# Patient Record
Sex: FEMALE | Race: WHITE | HISPANIC OR LATINO | Employment: FULL TIME | URBAN - METROPOLITAN AREA
[De-identification: names, ages, dates, MRNs, and addresses within clinical notes are randomized per-mention and may not be internally consistent; named-entity substitution may affect disease eponyms.]

---

## 2018-10-08 ENCOUNTER — HOSPITAL ENCOUNTER (EMERGENCY)
Facility: HOSPITAL | Age: 43
Discharge: HOME | End: 2018-10-08
Attending: EMERGENCY MEDICINE
Payer: MEDICAID

## 2018-10-08 VITALS
TEMPERATURE: 99 F | HEIGHT: 62 IN | HEART RATE: 68 BPM | DIASTOLIC BLOOD PRESSURE: 84 MMHG | RESPIRATION RATE: 17 BRPM | SYSTOLIC BLOOD PRESSURE: 144 MMHG | BODY MASS INDEX: 35.88 KG/M2 | WEIGHT: 195 LBS

## 2018-10-08 DIAGNOSIS — M67.432 GANGLION CYST OF WRIST, LEFT: Primary | ICD-10-CM

## 2018-10-08 LAB
B-HCG UR QL: NEGATIVE
CTP QC/QA: YES

## 2018-10-08 PROCEDURE — 81025 URINE PREGNANCY TEST: CPT | Performed by: EMERGENCY MEDICINE

## 2018-10-08 PROCEDURE — 99283 EMERGENCY DEPT VISIT LOW MDM: CPT | Mod: 25

## 2018-10-08 RX ORDER — IBUPROFEN 600 MG/1
600 TABLET ORAL EVERY 6 HOURS PRN
Qty: 20 TABLET | Refills: 0 | OUTPATIENT
Start: 2018-10-08 | End: 2022-02-27

## 2018-10-08 NOTE — ED PROVIDER NOTES
Encounter Date: 10/8/2018       History     Chief Complaint   Patient presents with    Hand Pain     left hand pain, increasing for 2 months, denies injury or fall, states was told by primary it was a cyst, states pain is increasing     The history is provided by the patient. No  was used.   Hand Injury    Illness onset: Pt c /o pain without injury to left hand due to ganglion cyst (known) for several mos. Has not seen an orthopedic specialist. There was no injury mechanism. Pain location: left hand. The quality of the pain is described as aching. The pain is at a severity of 6/10. The pain has been constant since the incident. Pertinent negatives include no fever. She reports no foreign bodies present. The symptoms are aggravated by movement, use and palpation.     Review of patient's allergies indicates:   Allergen Reactions    Dilaudid [hydromorphone]      History reviewed. No pertinent past medical history.  Past Surgical History:   Procedure Laterality Date     SECTION      OOPHORECTOMY       History reviewed. No pertinent family history.  Social History     Tobacco Use    Smoking status: Never Smoker   Substance Use Topics    Alcohol use: Yes     Comment: socially    Drug use: No     Review of Systems   Constitutional: Negative.  Negative for appetite change and fever.   HENT: Negative.  Negative for congestion, dental problem, ear discharge, hearing loss, mouth sores, rhinorrhea and trouble swallowing.    Eyes: Negative.  Negative for pain and discharge.   Respiratory: Negative.  Negative for shortness of breath.    Cardiovascular: Negative.  Negative for chest pain.   Gastrointestinal: Negative.  Negative for abdominal distention, abdominal pain, constipation, diarrhea, nausea, rectal pain and vomiting.   Endocrine: Negative.    Genitourinary: Negative.  Negative for dyspareunia, dysuria, hematuria, vaginal bleeding, vaginal discharge and vaginal pain.   Musculoskeletal:  Negative for back pain and neck pain.        Left hand pain/cyst   Skin: Negative.  Negative for rash.   Allergic/Immunologic: Negative.    Neurological: Negative.  Negative for facial asymmetry, speech difficulty and light-headedness.   Hematological: Negative.    Psychiatric/Behavioral: Negative.  Negative for agitation, dysphoric mood and sleep disturbance.   All other systems reviewed and are negative.      Physical Exam     Initial Vitals [10/08/18 1634]   BP Pulse Resp Temp SpO2   (!) 144/84 68 17 98.7 °F (37.1 °C) --      MAP       --         Physical Exam    Nursing note and vitals reviewed.  Constitutional: She appears well-developed and well-nourished. She is not diaphoretic.  Non-toxic appearance. She does not appear ill. No distress.   HENT:   Head: Normocephalic and atraumatic.   Eyes: Conjunctivae are normal. Right eye exhibits no discharge. Left eye exhibits no discharge.   Neck: Normal range of motion.   Cardiovascular: Normal rate, regular rhythm, normal heart sounds and intact distal pulses. Exam reveals no gallop and no friction rub.    No murmur heard.  Pulmonary/Chest: Breath sounds normal. No respiratory distress. She has no wheezes. She has no rhonchi. She has no rales. She exhibits no tenderness.   Musculoskeletal: Normal range of motion.        Left hand: She exhibits tenderness (ganglion cyst noted to dorsal aspect without erythema ). She exhibits normal range of motion, normal two-point discrimination, normal capillary refill, no deformity, no laceration and no swelling. Normal sensation noted. Decreased sensation is not present in the ulnar distribution, is not present in the medial redistribution and is not present in the radial distribution. Normal strength noted. She exhibits no finger abduction, no thumb/finger opposition and no wrist extension trouble.   Neurological: She is alert and oriented to person, place, and time.   Skin: Skin is warm and dry. Capillary refill takes less than  2 seconds. No rash noted.   Psychiatric: She has a normal mood and affect. Her behavior is normal. Judgment and thought content normal.         ED Course   Procedures  Labs Reviewed   POCT URINE PREGNANCY          Imaging Results    None          Medical Decision Making:   Initial Assessment:   Left ganglion cyst  Differential Diagnosis:   Abscess, contusion  ED Management:  Pt will be discharged home on Motrin with instructions to f/u with PCP for Ortho referral and return to ER as needed if symptoms worsen/fail to improve. Pt verbalized understanding of discharge instructions and treatment plan.                      Clinical Impression:   The encounter diagnosis was Ganglion cyst of wrist, left.                             Toussaint Battley III, FNP  10/08/18 7869

## 2019-04-09 ENCOUNTER — HOSPITAL ENCOUNTER (EMERGENCY)
Facility: HOSPITAL | Age: 44
Discharge: HOME | End: 2019-04-09
Attending: EMERGENCY MEDICINE
Payer: MEDICAID

## 2019-04-09 VITALS
SYSTOLIC BLOOD PRESSURE: 146 MMHG | RESPIRATION RATE: 18 BRPM | HEART RATE: 67 BPM | BODY MASS INDEX: 39.46 KG/M2 | OXYGEN SATURATION: 100 % | TEMPERATURE: 97 F | WEIGHT: 201 LBS | HEIGHT: 60 IN | DIASTOLIC BLOOD PRESSURE: 101 MMHG

## 2019-04-09 DIAGNOSIS — M25.462 KNEE EFFUSION, LEFT: Primary | ICD-10-CM

## 2019-04-09 DIAGNOSIS — M25.562 LEFT KNEE PAIN: ICD-10-CM

## 2019-04-09 LAB
B-HCG UR QL: NEGATIVE
CTP QC/QA: YES
URATE SERPL-MCNC: 5.5 MG/DL (ref 2.4–5.7)

## 2019-04-09 PROCEDURE — 25000003 PHARM REV CODE 250: Mod: ER | Performed by: EMERGENCY MEDICINE

## 2019-04-09 PROCEDURE — 84550 ASSAY OF BLOOD/URIC ACID: CPT

## 2019-04-09 PROCEDURE — 81025 URINE PREGNANCY TEST: CPT | Mod: ER | Performed by: EMERGENCY MEDICINE

## 2019-04-09 PROCEDURE — 29505 APPLICATION LONG LEG SPLINT: CPT | Mod: LT,ER

## 2019-04-09 PROCEDURE — 99284 EMERGENCY DEPT VISIT MOD MDM: CPT | Mod: 25,ER

## 2019-04-09 RX ORDER — KETOROLAC TROMETHAMINE 10 MG/1
10 TABLET, FILM COATED ORAL
Status: COMPLETED | OUTPATIENT
Start: 2019-04-09 | End: 2019-04-09

## 2019-04-09 RX ORDER — KETOROLAC TROMETHAMINE 10 MG/1
10 TABLET, FILM COATED ORAL EVERY 6 HOURS
Qty: 20 TABLET | Refills: 0 | Status: SHIPPED | OUTPATIENT
Start: 2019-04-09

## 2019-04-09 RX ADMIN — KETOROLAC TROMETHAMINE 10 MG: 10 TABLET, FILM COATED ORAL at 02:04

## 2019-04-09 NOTE — ED PROVIDER NOTES
Encounter Date: 2019    SCRIBE #1 NOTE: I, Tova Gomes, am scribing for, and in the presence of,  Dr. Costa . I have scribed the following portions of the note - Other sections scribed: HPI,ROS,PE .       History     Chief Complaint   Patient presents with    Knee Pain     pt c/o pain and swelling to the left knee without known injury. pt states pain started 2 weeks ago with no relief with otc meds.      42 y/o/f presents with left knee pain radiating down the leg for 2 weeks. Describes the pain as stabbing. Rates pain is a 10/10. Sometimes she notices it gets hot. She has been icing it at night and using a knee brace. Last night while applying bengay she noticed swelling to her leg and ankle. Worse with walking and weight bearing. She is a . Denies injury, back pain, neck pain, numbness, weakness, recent injury/travel or calf tenderness. Took 4 Ibuprofen with some relief.     The history is provided by the patient. No  was used.     Review of patient's allergies indicates:   Allergen Reactions    Dilaudid [hydromorphone]      History reviewed. No pertinent past medical history.  Past Surgical History:   Procedure Laterality Date     SECTION      OOPHORECTOMY       History reviewed. No pertinent family history.  Social History     Tobacco Use    Smoking status: Never Smoker   Substance Use Topics    Alcohol use: Yes     Comment: socially    Drug use: No     Review of Systems   Musculoskeletal: Positive for arthralgias (left knee pain). Negative for back pain and neck pain.   Skin: Negative for color change and pallor.   Neurological: Negative for weakness and numbness.       Physical Exam     Initial Vitals [19 1336]   BP Pulse Resp Temp SpO2   (!) 164/90 67 18 97.4 °F (36.3 °C) 100 %      MAP       --         Physical Exam    Nursing note and vitals reviewed.  Constitutional: She appears well-developed and well-nourished.   HENT:   Head: Normocephalic  and atraumatic.   Eyes: Conjunctivae are normal.   Neck: Normal range of motion. Neck supple.   Cardiovascular: Normal rate and intact distal pulses.   Pulmonary/Chest: Effort normal. No respiratory distress.   Musculoskeletal: Normal range of motion. She exhibits edema and tenderness.        Right knee: Tenderness found.        Legs:  Minimal swelling, Full ROM, anterior and posterior drawer negative.    Neurological: She is alert and oriented to person, place, and time. No sensory deficit.   Strong pulses.    Skin: Skin is warm and dry.   Psychiatric: She has a normal mood and affect.         ED Course   Procedures  Labs Reviewed   URIC ACID   POCT URINE PREGNANCY          Imaging Results          X-Ray Knee 3 View Left (Final result)  Result time 04/09/19 14:51:43    Final result by Roni Cuellar MD (04/09/19 14:51:43)                 Impression:      Moderate volume suprapatellar synovial complex/effusion.      Electronically signed by: Roni Cuellar MD  Date:    04/09/2019  Time:    14:51             Narrative:    EXAMINATION:  XR KNEE 3 VIEW LEFT    CLINICAL HISTORY:  Pain in left knee    TECHNIQUE:  AP, lateral, and Merchant views of the left knee were performed.    COMPARISON:  None    FINDINGS:  No displaced fracture or subluxation.  No suspicious bone lesion.    Mild femoral tibial and patellofemoral DJD.    Moderate suprapatellar synovial complex/effusion.                                 Medical Decision Making:   Initial Assessment:   42 y/o/f presents with left knee pain for 2 weeks. Exam significant for left lateral and anterior knee tenderness with minimal swelling.     Clinical Tests:   Lab Tests: Ordered and Reviewed  Radiological Study: Reviewed and Ordered  ED Management:  Left knee XR ordered.  X-ray does show an effusion to the knee.  Patient's placed a knee immobilizer and will be referred to Orthopedics.  Uric acid level was normal. Patient will be treated with Toradol.  She was  instructed to elevate her leg as much as possible.            Scribe Attestation:   Scribe #1: I performed the above scribed service and the documentation accurately describes the services I performed. I attest to the accuracy of the note.       I, Dr. Zaida Costa, personally performed the services described in this documentation. All medical record entries made by the scribe were at my direction and in my presence.  I have reviewed the chart and agree that the record reflects my personal performance and is accurate and complete. Zaida Costa MD.  7:35 AM 04/10/2019             Clinical Impression:     1. Knee effusion, left    2. Left knee pain                                     Zaida Costa MD  04/10/19 0735

## 2019-04-09 NOTE — ED NOTES
Blood drawn from Left ac per butterfly needle using aseptic technique. Pressure dressing applied to site. Pt tolerated well.

## 2021-01-29 ENCOUNTER — CLINICAL SUPPORT (OUTPATIENT)
Dept: URGENT CARE | Facility: CLINIC | Age: 46
End: 2021-01-29
Payer: MEDICAID

## 2021-01-29 DIAGNOSIS — Z11.52 ENCOUNTER FOR SCREENING LABORATORY TESTING FOR COVID-19 VIRUS: Primary | ICD-10-CM

## 2021-01-29 LAB
CTP QC/QA: YES
SARS-COV-2 RDRP RESP QL NAA+PROBE: NEGATIVE

## 2021-01-29 PROCEDURE — U0002 COVID-19 LAB TEST NON-CDC: HCPCS | Mod: QW,S$GLB,, | Performed by: PHYSICIAN ASSISTANT

## 2021-01-29 PROCEDURE — U0002: ICD-10-PCS | Mod: QW,S$GLB,, | Performed by: PHYSICIAN ASSISTANT

## 2021-03-04 ENCOUNTER — CLINICAL SUPPORT (OUTPATIENT)
Dept: URGENT CARE | Facility: CLINIC | Age: 46
End: 2021-03-04
Payer: MEDICAID

## 2021-03-04 DIAGNOSIS — Z11.9 SCREENING EXAMINATION FOR INFECTIOUS DISEASE: Primary | ICD-10-CM

## 2021-03-04 LAB
CTP QC/QA: YES
SARS-COV-2 RDRP RESP QL NAA+PROBE: NEGATIVE

## 2021-03-04 PROCEDURE — U0002: ICD-10-PCS | Mod: QW,S$GLB,, | Performed by: PHYSICIAN ASSISTANT

## 2021-03-04 PROCEDURE — U0002 COVID-19 LAB TEST NON-CDC: HCPCS | Mod: QW,S$GLB,, | Performed by: PHYSICIAN ASSISTANT

## 2021-04-16 ENCOUNTER — PATIENT MESSAGE (OUTPATIENT)
Dept: RESEARCH | Facility: HOSPITAL | Age: 46
End: 2021-04-16

## 2021-07-01 ENCOUNTER — PATIENT MESSAGE (OUTPATIENT)
Dept: ADMINISTRATIVE | Facility: OTHER | Age: 46
End: 2021-07-01

## 2021-08-15 ENCOUNTER — CLINICAL SUPPORT (OUTPATIENT)
Dept: URGENT CARE | Facility: CLINIC | Age: 46
End: 2021-08-15
Payer: MEDICAID

## 2021-08-15 DIAGNOSIS — Z20.822 ENCOUNTER FOR LABORATORY TESTING FOR COVID-19 VIRUS: Primary | ICD-10-CM

## 2021-08-15 LAB
CTP QC/QA: YES
SARS-COV-2 RDRP RESP QL NAA+PROBE: NEGATIVE

## 2021-08-15 PROCEDURE — U0002 COVID-19 LAB TEST NON-CDC: HCPCS | Mod: QW,S$GLB,, | Performed by: PHYSICIAN ASSISTANT

## 2021-08-15 PROCEDURE — U0002: ICD-10-PCS | Mod: QW,S$GLB,, | Performed by: PHYSICIAN ASSISTANT

## 2022-01-10 ENCOUNTER — OFFICE VISIT (OUTPATIENT)
Dept: URGENT CARE | Facility: CLINIC | Age: 47
End: 2022-01-10
Payer: MEDICAID

## 2022-01-10 VITALS
OXYGEN SATURATION: 99 % | HEART RATE: 91 BPM | DIASTOLIC BLOOD PRESSURE: 100 MMHG | HEIGHT: 60 IN | RESPIRATION RATE: 16 BRPM | SYSTOLIC BLOOD PRESSURE: 165 MMHG | BODY MASS INDEX: 39.27 KG/M2 | TEMPERATURE: 98 F | WEIGHT: 200 LBS

## 2022-01-10 DIAGNOSIS — J02.9 SORE THROAT: ICD-10-CM

## 2022-01-10 DIAGNOSIS — U07.1 COVID-19 VIRUS INFECTION: Primary | ICD-10-CM

## 2022-01-10 LAB
CTP QC/QA: YES
SARS-COV-2 RDRP RESP QL NAA+PROBE: POSITIVE

## 2022-01-10 PROCEDURE — 3077F PR MOST RECENT SYSTOLIC BLOOD PRESSURE >= 140 MM HG: ICD-10-PCS | Mod: CPTII,S$GLB,, | Performed by: NURSE PRACTITIONER

## 2022-01-10 PROCEDURE — 99203 OFFICE O/P NEW LOW 30 MIN: CPT | Mod: S$GLB,,, | Performed by: NURSE PRACTITIONER

## 2022-01-10 PROCEDURE — U0002: ICD-10-PCS | Mod: QW,S$GLB,, | Performed by: NURSE PRACTITIONER

## 2022-01-10 PROCEDURE — 1160F PR REVIEW ALL MEDS BY PRESCRIBER/CLIN PHARMACIST DOCUMENTED: ICD-10-PCS | Mod: CPTII,S$GLB,, | Performed by: NURSE PRACTITIONER

## 2022-01-10 PROCEDURE — 3077F SYST BP >= 140 MM HG: CPT | Mod: CPTII,S$GLB,, | Performed by: NURSE PRACTITIONER

## 2022-01-10 PROCEDURE — 99203 PR OFFICE/OUTPT VISIT, NEW, LEVL III, 30-44 MIN: ICD-10-PCS | Mod: S$GLB,,, | Performed by: NURSE PRACTITIONER

## 2022-01-10 PROCEDURE — 3080F DIAST BP >= 90 MM HG: CPT | Mod: CPTII,S$GLB,, | Performed by: NURSE PRACTITIONER

## 2022-01-10 PROCEDURE — 1159F MED LIST DOCD IN RCRD: CPT | Mod: CPTII,S$GLB,, | Performed by: NURSE PRACTITIONER

## 2022-01-10 PROCEDURE — 1159F PR MEDICATION LIST DOCUMENTED IN MEDICAL RECORD: ICD-10-PCS | Mod: CPTII,S$GLB,, | Performed by: NURSE PRACTITIONER

## 2022-01-10 PROCEDURE — 1160F RVW MEDS BY RX/DR IN RCRD: CPT | Mod: CPTII,S$GLB,, | Performed by: NURSE PRACTITIONER

## 2022-01-10 PROCEDURE — 3008F BODY MASS INDEX DOCD: CPT | Mod: CPTII,S$GLB,, | Performed by: NURSE PRACTITIONER

## 2022-01-10 PROCEDURE — 3008F PR BODY MASS INDEX (BMI) DOCUMENTED: ICD-10-PCS | Mod: CPTII,S$GLB,, | Performed by: NURSE PRACTITIONER

## 2022-01-10 PROCEDURE — 3080F PR MOST RECENT DIASTOLIC BLOOD PRESSURE >= 90 MM HG: ICD-10-PCS | Mod: CPTII,S$GLB,, | Performed by: NURSE PRACTITIONER

## 2022-01-10 PROCEDURE — U0002 COVID-19 LAB TEST NON-CDC: HCPCS | Mod: QW,S$GLB,, | Performed by: NURSE PRACTITIONER

## 2022-01-10 NOTE — LETTER
[address], [address] ? [place], [address] ? Phone [phone] ? Fax [phone]           Return to Work/School    Patient: Kesha Marcum  YOB: 1975   Date: 01/10/2022      To Whom It May Concern:     Kesha Marcum was in contact with/seen in my office on 01/10/2022. COVID-19 is present in our communities across the [facility]. Not all patients are eligible or appropriate to be tested. In this situation, your employee meets the following criteria:     Kesha Marcum has met the criteria for COVID-19 testing and has a POSITIVE result. She can return to work once they are asymptomatic for 24 hours without the use of fever reducing medications AND at least five days from the start of symptoms (or from the first positive result if they have no symptoms).      If you have any questions or concerns, or if I can be of further assistance, please do not hesitate to contact me.     Sincerely,          Pam Linares NP

## 2022-01-11 NOTE — PROGRESS NOTES
Subjective:       Patient ID: Kesha Marcum is a 46 y.o. female.    Vitals:  height is 5' (1.524 m) and weight is 90.7 kg (200 lb). Her temperature is 97.8 °F (36.6 °C). Her pulse is 91. Her respiration is 16 and oxygen saturation is 99%.     Chief Complaint: Sore Throat    Pt c/o sore throat since Friday. Mild congestion, she has been taking antihistamines that has been drying her sinuses up. No fever. No meds. BP is elevated, she is being evaluated for HTN through primary. She is vaccinated    Sore Throat   This is a new problem. The current episode started in the past 7 days. The problem has been gradually worsening. Associated symptoms include congestion and headaches. Pertinent negatives include no abdominal pain, coughing, diarrhea, drooling, ear discharge, ear pain, hoarse voice, plugged ear sensation, neck pain, shortness of breath, stridor, swollen glands, trouble swallowing or vomiting. She has tried nothing for the symptoms.       Constitution: Positive for sweating. Negative for chills, fatigue and fever.   HENT: Positive for congestion and sore throat. Negative for ear pain, ear discharge, drooling and trouble swallowing.    Neck: Negative for neck pain.   Respiratory: Negative for cough, shortness of breath and stridor.    Gastrointestinal: Negative for abdominal pain, vomiting and diarrhea.   Neurological: Positive for headaches.       Objective:      Physical Exam   Constitutional: She is oriented to person, place, and time. She appears well-developed and well-nourished. She is cooperative.  Non-toxic appearance. She does not have a sickly appearance. She does not appear ill. No distress.   HENT:   Head: Normocephalic and atraumatic.   Ears:   Right Ear: Hearing and external ear normal.   Left Ear: Hearing and external ear normal.   Nose: Nose normal. No mucosal edema, rhinorrhea or nasal deformity. No epistaxis. Right sinus exhibits no maxillary sinus tenderness and no frontal sinus tenderness.  Left sinus exhibits no maxillary sinus tenderness and no frontal sinus tenderness.   Mouth/Throat: Uvula is midline, oropharynx is clear and moist and mucous membranes are normal. No trismus in the jaw. Normal dentition. No uvula swelling. No oropharyngeal exudate, posterior oropharyngeal edema or posterior oropharyngeal erythema.   Eyes: Conjunctivae and lids are normal. No scleral icterus.   Neck: Trachea normal and phonation normal. Neck supple. No edema present. No erythema present. No neck rigidity present.   Cardiovascular: Normal rate, regular rhythm, normal heart sounds, intact distal pulses and normal pulses.   Pulmonary/Chest: Effort normal and breath sounds normal. No respiratory distress. She has no decreased breath sounds. She has no rhonchi.    Comments: Speaking in full and clear sentences with room air pulse ox of 99%    Abdominal: Normal appearance.   Musculoskeletal: Normal range of motion.         General: No deformity or edema. Normal range of motion.   Neurological: She is alert and oriented to person, place, and time. She exhibits normal muscle tone. Coordination normal.   Skin: Skin is warm, dry, intact, not diaphoretic and not pale.   Psychiatric: She has a normal mood and affect. Her speech is normal and behavior is normal. Judgment and thought content normal. Cognition and memory  Nursing note and vitals reviewed.    Results for orders placed or performed in visit on 01/10/22   POCT COVID-19 Rapid Screening   Result Value Ref Range    POC Rapid COVID Positive (A) Negative     Acceptable Yes            Assessment:       1. COVID-19 virus infection    2. Sore throat          Plan:       Lab reviewed.  Covid 19 risk score of 1    COVID-19 virus infection    Sore throat  -     POCT COVID-19 Rapid Screening      Patient Instructions   You have tested positive for COVID-19 today.      ISOLATION  If you tested positive and do not have symptoms, you must isolate for 5 days starting  on the day of the positive test. I    If you tested positive and have symptoms, you must isolate for 5 days starting on the day of the first symptoms,  not the day of the positive test.     This is the most important part, both the CDC and the [facility] emphasize that you do not test out of isolation.     After 5 days, if your symptoms have improved and you have not had fever on day 5, you can return to the community on day 6- NO TESTING REQUIRED!      In fact, we do not retest if you were positive in the last 90 days.    After your 5 days of isolation are completed, the CDC recommends strict mask use for the first 5 days that you come out of isolation.  Patient Education       COVID-19 Discharge Instructions   About this topic   Coronavirus disease 2019 is also known as COVID-19. It is a viral illness that infects the lungs. It is caused by a virus called SARS-associated coronavirus (SARS-CoV-2).  The signs of COVID-19 most often start a few days after you have been infected. In some people, it takes longer to show signs. Others never show signs of the infection. You may have a cough, fever, shaking chills and it may be hard to breathe. You may be very tired, have muscle aches, a headache or sore throat. Some people have an upset stomach or loose stools. Others lose their sense of smell or taste. You may not have these signs all the time and they may come and go while you are sick.  The virus spreads easily through droplets when you talk, sneeze, or cough. You can pass the virus to others when you are talking close together, singing, hugging, sharing food, or shaking hands. Doctors believe the germs also survive on surfaces like tables, door handles, and telephones. However, this is not a common way that COVID-19 spreads. Doctors believe you can also spread the infection even if you dont have any symptoms, but they do not know how that happens. This is why getting vaccinated is one of the best ways to keep you healthy  and slow the spread of the virus.  Some people have a mild case of COVID-19 and are able to stay at home and away from others until they feel better. Others may need to be in the hospital if they are very sick. Some people with COVID-19 can have some symptoms for weeks or months. People with COVID-19 must isolate themselves. You can start to be around others when your doctor says it is safe to do so.       What care is needed at home?   · Ask your doctor what you need to do when you go home. Make sure you ask questions if you do not understand what the doctor says.  · Drink lots of water, juice, or broth to replace fluids lost from a fever.  · You may use cool mist humidifiers to help ease congestion and coughing.  · Use 2 to 3 pillows to prop yourself up when you lie down to make it easier to breathe and sleep.  · Do not smoke and do not drink beer, wine, and mixed drinks (alcohol).  · To lower the chance of passing the infection to others, get a COVID-19 vaccine after your infection has resolved.  · If you have not been fully vaccinated:  ? Wear a mask over your mouth and nose if you are around others who are not sick. Cloth masks work best if they have more than one layer of fabric.  ? Wash your hands often.  ? Stay home in a separate room, if possible, away from others. Only go out to get medical care.  ? Use a separate bathroom if possible.  ? Do not make food for others.  What follow-up care is needed?   · Your doctor may ask you to make visits to the office to check on your progress. Be sure to keep these visits. Make sure you wear a mask at these visits.  · If you can, tell the staff you have COVID-19 ahead of time so they can take extra care to stop the disease from spreading.  · It may take a few weeks before your health returns to normal.  What drugs may be needed?   The doctor may order drugs to:  · Help with breathing  · Help with fever  · Help with swelling in your airways and lungs  · Control  coughing  · Ease a sore throat  · Help a runny or stuffy nose  Will physical activity be limited?   You may have to limit your physical activity. Talk to your doctor about the right amount of activity for you. If you have been very sick with COVID-19, it can take some time to get your strength back.  Will there be any other care needed?   Doctors do not know how long you can pass the virus on to others after you are sick. This is why it is important to stay in a separate room, if possible, when you are sick. For now, doctors are giving general guidelines for you to follow after you have been sick. Before you go around other people, you should:  · Be fever free for 24 hours without taking any drugs to lower the fever  · Have no symptoms of cough or shortness of breath  · Wait at least 10 days after first having symptoms or your first positive test, and you need to be symptom free as above. Some experts suggest waiting 20 days if you have had a more severe infection.  Talk with your doctor about getting a COVID-19 vaccine.  What problems could happen?   · Fluid loss. This is dehydration.  · Short-term or long-term lung damage  · Heart problems  · Death  When do I need to call the doctor?   · You are having so much trouble breathing that you can only say one or two words at a time.  · You need to sit upright at all times to be able to breathe and/or cannot lie down.  · You are very confused or cannot stay awake.  · Your lips or skin start to turn blue or grey.  · You think you might be having a medical emergency. Some examples of medical emergencies are:  ? Severe chest pain.  ? Not able to speak or move normally.  · You have trouble breathing when talking or sitting still.  · You have new shortness of breath.  · You become weak or dizzy.  · You have very dark urine or do not pass urine for more than 8 hours.  · You have new or worsening COVID-19 symptoms like:  ? Fever  ? Cough  ? Feeling very tired  ? Shaking  chills  ? Headache  ? Trouble swallowing  ? Throwing up  ? Loose stools  ? Reddish purple spots on your fingers or toes  Teach Back: Helping You Understand   The Teach Back Method helps you understand the information we are giving you. After you talk with the staff, tell them in your own words what you learned. This helps to make sure the staff has described each thing clearly. It also helps to explain things that may have been confusing. Before going home, make sure you can do these:  · I can tell you about my condition.  · I can tell you what may help ease my breathing.  · I can tell you what I can do to help avoid passing the infection to others.  · I can tell you what I will do if I have trouble breathing; feel sleepy or confused; or my fingertips, fingernails, skin, or lips are blue.  Where can I learn more?   Centers for Disease Control and Prevention  https://www.cdc.gov/coronavirus/2019-ncov/about/index.html   Centers for Disease Control and Prevention  https://www.cdc.gov/coronavirus/2019-ncov/hcp/disposition-in-home-patients.html   World Health Organization  https://www.who.int/news-room/q-a-detail/s-p-unqlqtlbjywwq   Last Reviewed Date   2021-10-05  Consumer Information Use and Disclaimer   This information is not specific medical advice and does not replace information you receive from your health care provider. This is only a brief summary of general information. It does NOT include all information about conditions, illnesses, injuries, tests, procedures, treatments, therapies, discharge instructions or life-style choices that may apply to you. You must talk with your health care provider for complete information about your health and treatment options. This information should not be used to decide whether or not to accept your health care providers advice, instructions or recommendations. Only your health care provider has the knowledge and training to provide advice that is right for you.  Copyright    Copyright © 2021 [employer] and its affiliates and/or licensors. All rights reserved.

## 2022-01-11 NOTE — PATIENT INSTRUCTIONS
You have tested positive for COVID-19 today.      ISOLATION  If you tested positive and do not have symptoms, you must isolate for 5 days starting on the day of the positive test. I    If you tested positive and have symptoms, you must isolate for 5 days starting on the day of the first symptoms,  not the day of the positive test.     This is the most important part, both the CDC and the [facility] emphasize that you do not test out of isolation.     After 5 days, if your symptoms have improved and you have not had fever on day 5, you can return to the community on day 6- NO TESTING REQUIRED!      In fact, we do not retest if you were positive in the last 90 days.    After your 5 days of isolation are completed, the CDC recommends strict mask use for the first 5 days that you come out of isolation.  Patient Education       COVID-19 Discharge Instructions   About this topic   Coronavirus disease 2019 is also known as COVID-19. It is a viral illness that infects the lungs. It is caused by a virus called SARS-associated coronavirus (SARS-CoV-2).  The signs of COVID-19 most often start a few days after you have been infected. In some people, it takes longer to show signs. Others never show signs of the infection. You may have a cough, fever, shaking chills and it may be hard to breathe. You may be very tired, have muscle aches, a headache or sore throat. Some people have an upset stomach or loose stools. Others lose their sense of smell or taste. You may not have these signs all the time and they may come and go while you are sick.  The virus spreads easily through droplets when you talk, sneeze, or cough. You can pass the virus to others when you are talking close together, singing, hugging, sharing food, or shaking hands. Doctors believe the germs also survive on surfaces like tables, door handles, and telephones. However, this is not a common way that COVID-19 spreads. Doctors believe you can also spread the infection even  if you dont have any symptoms, but they do not know how that happens. This is why getting vaccinated is one of the best ways to keep you healthy and slow the spread of the virus.  Some people have a mild case of COVID-19 and are able to stay at home and away from others until they feel better. Others may need to be in the hospital if they are very sick. Some people with COVID-19 can have some symptoms for weeks or months. People with COVID-19 must isolate themselves. You can start to be around others when your doctor says it is safe to do so.       What care is needed at home?   · Ask your doctor what you need to do when you go home. Make sure you ask questions if you do not understand what the doctor says.  · Drink lots of water, juice, or broth to replace fluids lost from a fever.  · You may use cool mist humidifiers to help ease congestion and coughing.  · Use 2 to 3 pillows to prop yourself up when you lie down to make it easier to breathe and sleep.  · Do not smoke and do not drink beer, wine, and mixed drinks (alcohol).  · To lower the chance of passing the infection to others, get a COVID-19 vaccine after your infection has resolved.  · If you have not been fully vaccinated:  ? Wear a mask over your mouth and nose if you are around others who are not sick. Cloth masks work best if they have more than one layer of fabric.  ? Wash your hands often.  ? Stay home in a separate room, if possible, away from others. Only go out to get medical care.  ? Use a separate bathroom if possible.  ? Do not make food for others.  What follow-up care is needed?   · Your doctor may ask you to make visits to the office to check on your progress. Be sure to keep these visits. Make sure you wear a mask at these visits.  · If you can, tell the staff you have COVID-19 ahead of time so they can take extra care to stop the disease from spreading.  · It may take a few weeks before your health returns to normal.  What drugs may be  needed?   The doctor may order drugs to:  · Help with breathing  · Help with fever  · Help with swelling in your airways and lungs  · Control coughing  · Ease a sore throat  · Help a runny or stuffy nose  Will physical activity be limited?   You may have to limit your physical activity. Talk to your doctor about the right amount of activity for you. If you have been very sick with COVID-19, it can take some time to get your strength back.  Will there be any other care needed?   Doctors do not know how long you can pass the virus on to others after you are sick. This is why it is important to stay in a separate room, if possible, when you are sick. For now, doctors are giving general guidelines for you to follow after you have been sick. Before you go around other people, you should:  · Be fever free for 24 hours without taking any drugs to lower the fever  · Have no symptoms of cough or shortness of breath  · Wait at least 10 days after first having symptoms or your first positive test, and you need to be symptom free as above. Some experts suggest waiting 20 days if you have had a more severe infection.  Talk with your doctor about getting a COVID-19 vaccine.  What problems could happen?   · Fluid loss. This is dehydration.  · Short-term or long-term lung damage  · Heart problems  · Death  When do I need to call the doctor?   · You are having so much trouble breathing that you can only say one or two words at a time.  · You need to sit upright at all times to be able to breathe and/or cannot lie down.  · You are very confused or cannot stay awake.  · Your lips or skin start to turn blue or grey.  · You think you might be having a medical emergency. Some examples of medical emergencies are:  ? Severe chest pain.  ? Not able to speak or move normally.  · You have trouble breathing when talking or sitting still.  · You have new shortness of breath.  · You become weak or dizzy.  · You have very dark urine or do not  pass urine for more than 8 hours.  · You have new or worsening COVID-19 symptoms like:  ? Fever  ? Cough  ? Feeling very tired  ? Shaking chills  ? Headache  ? Trouble swallowing  ? Throwing up  ? Loose stools  ? Reddish purple spots on your fingers or toes  Teach Back: Helping You Understand   The Teach Back Method helps you understand the information we are giving you. After you talk with the staff, tell them in your own words what you learned. This helps to make sure the staff has described each thing clearly. It also helps to explain things that may have been confusing. Before going home, make sure you can do these:  · I can tell you about my condition.  · I can tell you what may help ease my breathing.  · I can tell you what I can do to help avoid passing the infection to others.  · I can tell you what I will do if I have trouble breathing; feel sleepy or confused; or my fingertips, fingernails, skin, or lips are blue.  Where can I learn more?   Centers for Disease Control and Prevention  https://www.cdc.gov/coronavirus/2019-ncov/about/index.html   Centers for Disease Control and Prevention  https://www.cdc.gov/coronavirus/2019-ncov/hcp/disposition-in-home-patients.html   World Health Organization  https://www.who.int/news-room/q-a-detail/q-v-sgvpacadyoegk   Last Reviewed Date   2021-10-05  Consumer Information Use and Disclaimer   This information is not specific medical advice and does not replace information you receive from your health care provider. This is only a brief summary of general information. It does NOT include all information about conditions, illnesses, injuries, tests, procedures, treatments, therapies, discharge instructions or life-style choices that may apply to you. You must talk with your health care provider for complete information about your health and treatment options. This information should not be used to decide whether or not to accept your health care providers advice,  instructions or recommendations. Only your health care provider has the knowledge and training to provide advice that is right for you.  Copyright   Copyright © 2021 [employer] and its affiliates and/or licensors. All rights reserved.

## 2022-02-27 ENCOUNTER — HOSPITAL ENCOUNTER (EMERGENCY)
Facility: HOSPITAL | Age: 47
Discharge: HOME | End: 2022-02-27
Attending: EMERGENCY MEDICINE
Payer: MEDICAID

## 2022-02-27 VITALS
RESPIRATION RATE: 18 BRPM | HEIGHT: 60 IN | BODY MASS INDEX: 39.85 KG/M2 | HEART RATE: 72 BPM | SYSTOLIC BLOOD PRESSURE: 134 MMHG | OXYGEN SATURATION: 99 % | TEMPERATURE: 98 F | DIASTOLIC BLOOD PRESSURE: 87 MMHG | WEIGHT: 203 LBS

## 2022-02-27 DIAGNOSIS — R05.9 COUGH: ICD-10-CM

## 2022-02-27 DIAGNOSIS — H60.501 ACUTE OTITIS EXTERNA OF RIGHT EAR, UNSPECIFIED TYPE: Primary | ICD-10-CM

## 2022-02-27 LAB
B-HCG UR QL: NEGATIVE
CTP QC/QA: YES
CTP QC/QA: YES
INFLUENZA A ANTIGEN, POC: NEGATIVE
INFLUENZA B ANTIGEN, POC: NEGATIVE
POC RAPID STREP A: NEGATIVE
SARS-COV-2 RDRP RESP QL NAA+PROBE: NEGATIVE

## 2022-02-27 PROCEDURE — 87804 INFLUENZA ASSAY W/OPTIC: CPT | Mod: ER

## 2022-02-27 PROCEDURE — U0002 COVID-19 LAB TEST NON-CDC: HCPCS | Mod: ER | Performed by: EMERGENCY MEDICINE

## 2022-02-27 PROCEDURE — 99284 EMERGENCY DEPT VISIT MOD MDM: CPT | Mod: 25,ER

## 2022-02-27 PROCEDURE — 81025 URINE PREGNANCY TEST: CPT | Mod: ER | Performed by: EMERGENCY MEDICINE

## 2022-02-27 PROCEDURE — 63600175 PHARM REV CODE 636 W HCPCS: Mod: ER | Performed by: EMERGENCY MEDICINE

## 2022-02-27 PROCEDURE — 96372 THER/PROPH/DIAG INJ SC/IM: CPT | Mod: ER

## 2022-02-27 RX ORDER — AMOXICILLIN AND CLAVULANATE POTASSIUM 875; 125 MG/1; MG/1
1 TABLET, FILM COATED ORAL 2 TIMES DAILY
Qty: 20 TABLET | Refills: 0 | Status: SHIPPED | OUTPATIENT
Start: 2022-02-27 | End: 2022-03-09

## 2022-02-27 RX ORDER — FLUTICASONE PROPIONATE 50 MCG
1 SPRAY, SUSPENSION (ML) NASAL 2 TIMES DAILY
Qty: 16 G | Refills: 0 | OUTPATIENT
Start: 2022-02-27 | End: 2023-02-04

## 2022-02-27 RX ORDER — KETOROLAC TROMETHAMINE 30 MG/ML
30 INJECTION, SOLUTION INTRAMUSCULAR; INTRAVENOUS
Status: COMPLETED | OUTPATIENT
Start: 2022-02-27 | End: 2022-02-27

## 2022-02-27 RX ORDER — LORATADINE 10 MG/1
10 TABLET ORAL DAILY
Qty: 60 TABLET | Refills: 0 | Status: SHIPPED | OUTPATIENT
Start: 2022-02-27 | End: 2023-02-27

## 2022-02-27 RX ORDER — ACETAMINOPHEN 500 MG
500 TABLET ORAL EVERY 6 HOURS PRN
Qty: 30 TABLET | Refills: 0 | OUTPATIENT
Start: 2022-02-27 | End: 2023-02-04

## 2022-02-27 RX ORDER — IBUPROFEN 600 MG/1
600 TABLET ORAL EVERY 6 HOURS PRN
Qty: 20 TABLET | Refills: 0 | OUTPATIENT
Start: 2022-02-27 | End: 2023-02-04

## 2022-02-27 RX ADMIN — KETOROLAC TROMETHAMINE 30 MG: 30 INJECTION, SOLUTION INTRAMUSCULAR at 10:02

## 2022-02-27 NOTE — ED PROVIDER NOTES
Encounter Date: 2022    SCRIBE #1 NOTE: I, Lisa Raymundo, am scribing for, and in the presence of,  Kylah Zhao DO. I have scribed the following portions of the note - Other sections scribed: HPI; ROS; PE.       History     Chief Complaint   Patient presents with    Otalgia     Pt reports right ear pain/fullness x4 days     Kesha Marcum is a 46 y.o. female with Hx of HTN who presents to the ED for chief complaint of right otalgia onset 1 week ago. She has associated symptoms of coughing and sore throat. Patient attempted treatment with OTC ear drops with no relief. She denies fever. No further complaints at this present time.     The history is provided by the patient. No  was used.     Review of patient's allergies indicates:   Allergen Reactions    Dilaudid [hydromorphone]      Past Medical History:   Diagnosis Date    Hypertension      Past Surgical History:   Procedure Laterality Date     SECTION      OOPHORECTOMY       No family history on file.  Social History     Tobacco Use    Smoking status: Never Smoker    Smokeless tobacco: Never Used   Substance Use Topics    Alcohol use: Yes     Comment: socially    Drug use: No     Review of Systems   Constitutional: Negative for fever.   HENT: Positive for ear pain and sore throat.    Respiratory: Positive for cough. Negative for shortness of breath.    Cardiovascular: Negative for chest pain.   Gastrointestinal: Negative for abdominal pain, diarrhea, nausea and vomiting.   Genitourinary: Negative for dysuria.   Musculoskeletal: Negative for back pain.   Skin: Negative for rash.   Neurological: Negative for headaches.   Psychiatric/Behavioral: Negative for confusion.   All other systems reviewed and are negative.      Physical Exam     Initial Vitals [22 1021]   BP Pulse Resp Temp SpO2   (!) 145/90 77 16 98.2 °F (36.8 °C) 99 %      MAP       --         Patient gave consent to have physical exam performed.  Physical  Exam    Nursing note and vitals reviewed.  Constitutional: She appears well-developed and well-nourished.   HENT:   Head: Normocephalic and atraumatic.   Right Ear: External ear normal. Tympanic membrane is erythematous.   Left Ear: External ear normal.   Nose: Mucosal edema and rhinorrhea present.   Mouth/Throat: Oropharynx is clear and moist.   Eyes: Conjunctivae and EOM are normal. Pupils are equal, round, and reactive to light.   Neck: Phonation normal. Neck supple.   Normal range of motion.  Cardiovascular: Normal rate, regular rhythm, normal heart sounds and intact distal pulses. Exam reveals no gallop and no friction rub.    No murmur heard.  Pulmonary/Chest: Effort normal and breath sounds normal. No stridor. No respiratory distress. She has no wheezes. She has no rhonchi. She has no rales. She exhibits no tenderness.   Abdominal: Abdomen is soft. Bowel sounds are normal. She exhibits no distension. There is no abdominal tenderness. There is no rigidity, no rebound and no guarding.   Musculoskeletal:         General: No tenderness or edema. Normal range of motion.      Cervical back: Normal range of motion and neck supple.     Neurological: She is alert and oriented to person, place, and time. She has normal strength. No cranial nerve deficit or sensory deficit. GCS score is 15. GCS eye subscore is 4. GCS verbal subscore is 5. GCS motor subscore is 6.   Skin: Skin is warm and dry. Capillary refill takes less than 2 seconds. No rash noted.   Psychiatric: She has a normal mood and affect. Her behavior is normal.         ED Course   Procedures  Labs Reviewed   POCT URINE PREGNANCY   SARS-COV-2 RDRP GENE    Narrative:     This test utilizes isothermal nucleic acid amplification   technology to detect the SARS-CoV-2 RdRp nucleic acid segment.   The analytical sensitivity (limit of detection) is 125 genome   equivalents/mL.   A POSITIVE result implies infection with the SARS-CoV-2 virus;   the patient is presumed  to be contagious.     A NEGATIVE result means that SARS-CoV-2 nucleic acids are not   present above the limit of detection. A NEGATIVE result should be   treated as presumptive. It does not rule out the possibility of   COVID-19 and should not be the sole basis for treatment decisions.   If COVID-19 is strongly suspected based on clinical and exposure   history, re-testing using an alternate molecular assay should be   considered.   This test is only for use under the Food and Drug   Administration s Emergency Use Authorization (EUA).   Commercial kits are provided by [email or web address].   Performance characteristics of the EUA have been independently   verified by [facility] Department of   Pathology and Laboratory Medicine.   _________________________________________________________________   The authorized Fact Sheet for Healthcare Providers and the authorized Fact   Sheet for Patients of the ID NOW COVID-19 are available on the FDA   website:     https://www.fda.gov/media/743625/download  https://www.fda.gov/media/952128/download          POCT STREP A, RAPID   POCT RAPID INFLUENZA A/B          Imaging Results          X-Ray Chest AP Portable (Final result)  Result time 02/27/22 12:02:48    Final result by Justice Prakash MD (02/27/22 12:02:48)                 Impression:      No radiographic evidence of pneumonia or other source of cough, noting that early/mild viral pneumonia may be radiographically occult.      Electronically signed by: Justice Prakash MD  Date:    02/27/2022  Time:    12:02             Narrative:    EXAMINATION:  XR CHEST AP PORTABLE    CLINICAL HISTORY:  Cough, unspecified    TECHNIQUE:  Single frontal view of the chest was performed.    COMPARISON:  None    FINDINGS:  The lungs are clear, with normal appearance of pulmonary vasculature and no pleural effusion or pneumothorax.    The cardiac silhouette is normal in size. The hilar and mediastinal contours are unremarkable.    Bones  are intact.                                 Medications   ketorolac injection 30 mg (30 mg Intramuscular Given 2/27/22 1056)     Medical Decision Making:   History:   Old Medical Records: I decided to obtain old medical records.  Independently Interpreted Test(s):   I have ordered and independently interpreted X-rays - see prior notes.  Clinical Tests:   Lab Tests: Ordered and Reviewed  The following lab test(s) were unremarkable: UPT  Radiological Study: Ordered and Reviewed  Chief complaint: right otalgia onset 1 week ago.   Differential diagnosis: pregnancy, Otitis Media, Otitis Externa, bronchitis, pneumonia, streptococcal pharyngitis    Treatment in the ED: PE,     ketorolac injection 30 mg          Patient reports feeling better after treatment in the ER.      Discussed treatment, prescriptions, labs, and imaging results.    Discharge home with Augmentin, Flonase, Claritin, ibuprofen, and Tylenol.  Fill and take prescriptions as directed.  Return to the ED if symptoms worsen or do not resolve.   Answered questions and discussed discharge plan.    Patient feels better and is ready for discharge.  Follow up with PCP/specialist in 1 day.        Scribe Attestation:   Scribe #1: I performed the above scribed service and the documentation accurately describes the services I performed. I attest to the accuracy of the note.                I, Dr. Kylah Zhao, personally performed the services described in this documentation. This document was produced by a scribe under my direction and in my presence. All medical record entries made by the scribe were at my direction and in my presence.  I have reviewed the chart and agree that the record reflects my personal performance and is accurate and complete. Kylah Zhao, .     03/01/2022 8:56 AM    Clinical Impression:   Final diagnoses:  [R05.9] Cough  [H60.501] Acute otitis externa of right ear, unspecified type (Primary)          ED Disposition Condition     Discharge Stable        ED Prescriptions     Medication Sig Dispense Start Date End Date Auth. Provider    ibuprofen (ADVIL,MOTRIN) 600 MG tablet Take 1 tablet (600 mg total) by mouth every 6 (six) hours as needed for Pain (Take with food as needed for mild-to-moderate pain). 20 tablet 2/27/2022  Kylah Zhao, DO    acetaminophen (TYLENOL) 500 MG tablet Take 1 tablet (500 mg total) by mouth every 6 (six) hours as needed for Pain. 30 tablet 2/27/2022  Kylah Zhao DO    amoxicillin-clavulanate 875-125mg (AUGMENTIN) 875-125 mg per tablet Take 1 tablet by mouth 2 (two) times daily. for 10 days 20 tablet 2/27/2022 3/9/2022 Kylah Zhao DO    fluticasone propionate (FLONASE) 50 mcg/actuation nasal spray 1 spray (50 mcg total) by Each Nostril route 2 (two) times daily. 16 g 2/27/2022  Kylah Zhao DO    loratadine (CLARITIN) 10 mg tablet Take 1 tablet (10 mg total) by mouth once daily. 60 tablet 2/27/2022 2/27/2023 Kylah Zhao DO    sodium chloride (OCEAN NASAL) 0.65 % nasal spray 1 spray by Nasal route every 3 (three) hours as needed for Congestion. 1 each 2/27/2022  Kylah Zhao DO        Follow-up Information     Follow up With Specialties Details Why Contact Info Additional Information    [facility]  Schedule an appointment as soon as possible for a visit in 1 day Please establish a primary care physician [address]  [place] [address]  [phone]       [facility] Schedule an appointment as soon as possible for a visit in 1 day Please establish a primary care physician [address], [address]  [facility] [address] [phone] Please park in [address] or [address] and use Ti holcomb. Take Medical Office Bldg. elevators.           Kylah Zhao DO  03/01/22 [id number]

## 2022-02-27 NOTE — Clinical Note
"Kesha Bouchera" Jossue was seen and treated in our emergency department on 2/27/2022.  She may return to work on 03/01/2022.       If you have any questions or concerns, please don't hesitate to call.      Kylah Zhao, DO"

## 2023-01-24 ENCOUNTER — HOSPITAL ENCOUNTER (EMERGENCY)
Facility: HOSPITAL | Age: 48
Discharge: HOME | End: 2023-01-24
Attending: EMERGENCY MEDICINE
Payer: MEDICAID

## 2023-01-24 VITALS
OXYGEN SATURATION: 99 % | WEIGHT: 213 LBS | BODY MASS INDEX: 41.82 KG/M2 | HEIGHT: 60 IN | TEMPERATURE: 98 F | SYSTOLIC BLOOD PRESSURE: 133 MMHG | DIASTOLIC BLOOD PRESSURE: 74 MMHG | HEART RATE: 78 BPM | RESPIRATION RATE: 16 BRPM

## 2023-01-24 DIAGNOSIS — R11.0 NAUSEA: ICD-10-CM

## 2023-01-24 DIAGNOSIS — J06.9 URI WITH COUGH AND CONGESTION: Primary | ICD-10-CM

## 2023-01-24 LAB
B-HCG UR QL: NEGATIVE
CTP QC/QA: YES
CTP QC/QA: YES
INFLUENZA A ANTIGEN, POC: NEGATIVE
INFLUENZA B ANTIGEN, POC: NEGATIVE
SARS-COV-2 RDRP RESP QL NAA+PROBE: NEGATIVE

## 2023-01-24 PROCEDURE — 81025 URINE PREGNANCY TEST: CPT | Mod: ER | Performed by: NURSE PRACTITIONER

## 2023-01-24 PROCEDURE — 99284 EMERGENCY DEPT VISIT MOD MDM: CPT | Mod: ER

## 2023-01-24 PROCEDURE — 87635 SARS-COV-2 COVID-19 AMP PRB: CPT | Mod: ER | Performed by: NURSE PRACTITIONER

## 2023-01-24 PROCEDURE — 87804 INFLUENZA ASSAY W/OPTIC: CPT | Mod: ER

## 2023-01-24 RX ORDER — CETIRIZINE HYDROCHLORIDE, PSEUDOEPHEDRINE HYDROCHLORIDE 5; 120 MG/1; MG/1
1 TABLET, FILM COATED, EXTENDED RELEASE ORAL DAILY
Qty: 14 TABLET | Refills: 0 | Status: SHIPPED | OUTPATIENT
Start: 2023-01-24 | End: 2023-01-31

## 2023-01-24 RX ORDER — ONDANSETRON 8 MG/1
8 TABLET, ORALLY DISINTEGRATING ORAL EVERY 8 HOURS PRN
Qty: 15 TABLET | Refills: 0 | Status: SHIPPED | OUTPATIENT
Start: 2023-01-24

## 2023-01-24 RX ORDER — GUAIFENESIN AND DEXTROMETHORPHAN HYDROBROMIDE 1200; 60 MG/1; MG/1
1 TABLET, EXTENDED RELEASE ORAL EVERY 12 HOURS
Qty: 14 TABLET | Refills: 0 | Status: SHIPPED | OUTPATIENT
Start: 2023-01-24 | End: 2023-01-31

## 2023-01-24 RX ORDER — BENZONATATE 200 MG/1
200 CAPSULE ORAL 3 TIMES DAILY PRN
Qty: 15 CAPSULE | Refills: 0 | Status: SHIPPED | OUTPATIENT
Start: 2023-01-24 | End: 2023-01-29

## 2023-01-24 NOTE — Clinical Note
"Kesha"Earle Marcum was seen and treated in our emergency department on 1/24/2023.  She may return to work on 01/26/2023.       If you have any questions or concerns, please don't hesitate to call.      Eryn Leslie MD"

## 2023-01-24 NOTE — ED PROVIDER NOTES
Encounter Date: 2023    SCRIBE #1 NOTE: I, Yocasta Lew, am scribing for, and in the presence of,  Eryn Leslie MD. I have scribed the following portions of the note - Other sections scribed: HPI; ROS; PE.     History     Chief Complaint   Patient presents with    URI     Pt reports sore throat, body aches, headache, cough, nausea, chest tightness since Thursday.      Kesha Marcum is a 47 y.o. female with Hx of HTN who presents to the ED for chief complaint of sore throat and cough that began 5 days ago. Patient has associated myalgias, headaches, and nausea. Patient attempted treatment with Mucinex, Tylenol, and Ibuprofen with little relief. No further complaints at this time. Patient notes her daughter is sick with similar symptoms. Patient is allergic to dilaudid.      The history is provided by the patient. No  was used.   Review of patient's allergies indicates:   Allergen Reactions    Dilaudid [hydromorphone]      Past Medical History:   Diagnosis Date    Hypertension      Past Surgical History:   Procedure Laterality Date     SECTION      OOPHORECTOMY       No family history on file.  Social History     Tobacco Use    Smoking status: Never    Smokeless tobacco: Never   Substance Use Topics    Alcohol use: Yes     Comment: socially    Drug use: No     Review of Systems   Constitutional:  Negative for activity change, appetite change, chills and fever.   HENT:  Positive for sore throat. Negative for congestion, rhinorrhea and sneezing.    Respiratory:  Positive for cough. Negative for choking, shortness of breath and wheezing.    Cardiovascular:  Negative for chest pain and palpitations.   Gastrointestinal:  Positive for nausea. Negative for abdominal pain, diarrhea and vomiting.   Musculoskeletal:  Positive for myalgias.   Neurological:  Positive for headaches. Negative for dizziness, syncope and light-headedness.   All other systems reviewed and are negative.    Physical  Exam     Initial Vitals [01/24/23 1512]   BP Pulse Resp Temp SpO2   (!) 176/110 83 16 98.3 °F (36.8 °C) 98 %      MAP       --         Physical Exam    Nursing note and vitals reviewed.  Constitutional: Vital signs are normal. She appears well-developed and well-nourished. She is cooperative. She does not appear ill. No distress.   HENT:   Head: Normocephalic and atraumatic.   Right Ear: Tympanic membrane normal.   Left Ear: Tympanic membrane normal.   Nose: Nose normal.   Mouth/Throat: Uvula is midline, oropharynx is clear and moist and mucous membranes are normal.   Eyes: Conjunctivae and lids are normal.   Neck: Neck supple.   Normal range of motion.  Cardiovascular:  Normal rate, regular rhythm, S1 normal, S2 normal and normal heart sounds.           No murmur heard.  Pulmonary/Chest: Effort normal and breath sounds normal. No respiratory distress. She has no wheezes.   Abdominal: Abdomen is soft. Bowel sounds are normal. She exhibits no distension. There is no abdominal tenderness.   Musculoskeletal:         General: No tenderness or edema.      Cervical back: Normal range of motion and neck supple.     Neurological: She is alert and oriented to person, place, and time.   Skin: Skin is warm, dry and intact.   Psychiatric: She has a normal mood and affect. Her speech is normal and behavior is normal.       ED Course   Procedures  Labs Reviewed   POCT URINE PREGNANCY   SARS-COV-2 RDRP GENE    Narrative:     This test utilizes isothermal nucleic acid amplification technology to detect the SARS-CoV-2 RdRp nucleic acid segment. The analytical sensitivity (limit of detection) is 500 copies/swab.     A POSITIVE result is indicative of the presence of SARS-CoV-2 RNA; clinical correlation with patient history and other diagnostic information is necessary to determine patient infection status.    A NEGATIVE result means that SARS-CoV-2 nucleic acids are not present above the limit of detection. A NEGATIVE result should  be treated as presumptive. It does not rule out the possibility of COVID-19 and should not be the sole basis for treatment decisions. If COVID-19 is strongly suspected based on clinical and exposure history, re-testing using an alternate molecular assay should be considered.     This test is only for use under the Food and Drug Administration s Emergency Use Authorization (EUA).     Commercial kits are provided by [employer]. Performance characteristics of the EUA have been independently verified by [facility] Department of Pathology and Laboratory Medicine.   _________________________________________________________________   The authorized Fact Sheet for Healthcare Providers and the authorized Fact Sheet for Patients of the ID NOW COVID-19 are available on the FDA website:    https://www.fda.gov/media/274635/download      https://www.fda.gov/media/073544/download      POCT INFLUENZA A/B MOLECULAR   POCT RAPID INFLUENZA A/B          Imaging Results    None          Medications - No data to display  Medical Decision Making:   History:   Old Medical Records: I decided to obtain old medical records.  Clinical Tests:   Lab Tests: Reviewed and Ordered  The following lab test(s) were unremarkable: UPT  ED Management:  47F with flu-like symptoms x 5 days. Does not look acutely ill on exam. No fever, no signs of OM or exudative pharyngitis. COVID and flu tests are negative. Suspect other viral illness - treat symptomatically.        Scribe Attestation:   Scribe #1: I performed the above scribed service and the documentation accurately describes the services I performed. I attest to the accuracy of the note.            I, Dr. Eryn Leslie, personally performed the services described in this documentation.   All medical record entries made by the scribe were at my direction and in my presence.   I have reviewed the chart and agree that the record is accurate and complete.   Eryn Leslie MD.  4:45 PM 01/24/2023           Clinical Impression:   Final diagnoses:  [J06.9] URI with cough and congestion (Primary)  [R11.0] Nausea        ED Disposition Condition    Discharge Stable          ED Prescriptions       Medication Sig Dispense Start Date End Date Auth. Provider    cetirizine-pseudoephedrine 5-120 mg Tb12 Take 1 tablet by mouth once daily. for 7 days 14 tablet 1/24/2023 1/31/2023 Eyrn Leslie MD    dextromethorphan-guaiFENesin 60-1,200 mg per 12 hr tablet Take 1 tablet by mouth every 12 (twelve) hours. for 7 days 14 tablet 1/24/2023 1/31/2023 Eryn Leslie MD    benzonatate (TESSALON) 200 MG capsule Take 1 capsule (200 mg total) by mouth 3 (three) times daily as needed for Cough. 15 capsule 1/24/2023 1/29/2023 Eryn Leslie MD    ondansetron (ZOFRAN-ODT) 8 MG TbDL Take 1 tablet (8 mg total) by mouth every 8 (eight) hours as needed (nausea/vomiting). 15 tablet 1/24/2023 -- Eryn Leslie MD          Follow-up Information    None          Eryn Leslie MD  01/24/23 7319

## 2023-01-24 NOTE — DISCHARGE INSTRUCTIONS
You do not have covid or flu but you most likely have some other viral illness. REst. Drink lots of water and take the prescribed medicines. You may take motrin and tylenol while taking this medicine. Return to ER for any concerns or worsening symptoms.

## 2023-02-04 ENCOUNTER — HOSPITAL ENCOUNTER (EMERGENCY)
Facility: HOSPITAL | Age: 48
Discharge: HOME | End: 2023-02-04
Attending: EMERGENCY MEDICINE
Payer: MEDICAID

## 2023-02-04 VITALS
HEART RATE: 92 BPM | SYSTOLIC BLOOD PRESSURE: 110 MMHG | TEMPERATURE: 98 F | RESPIRATION RATE: 20 BRPM | BODY MASS INDEX: 39.66 KG/M2 | HEIGHT: 60 IN | WEIGHT: 202 LBS | DIASTOLIC BLOOD PRESSURE: 66 MMHG | OXYGEN SATURATION: 95 %

## 2023-02-04 DIAGNOSIS — R00.0 TACHYCARDIA: ICD-10-CM

## 2023-02-04 DIAGNOSIS — J10.1 INFLUENZA A: Primary | ICD-10-CM

## 2023-02-04 DIAGNOSIS — N39.0 ACUTE URINARY TRACT INFECTION: ICD-10-CM

## 2023-02-04 DIAGNOSIS — E87.6 HYPOKALEMIA: ICD-10-CM

## 2023-02-04 LAB
ALBUMIN SERPL-MCNC: 3.6 G/DL (ref 3.3–5.5)
ALLENS TEST: ABNORMAL
ALP SERPL-CCNC: 91 U/L (ref 42–141)
B-HCG UR QL: NEGATIVE
BILIRUB SERPL-MCNC: 0.4 MG/DL (ref 0.2–1.6)
BILIRUBIN, POC UA: NEGATIVE
BLOOD, POC UA: ABNORMAL
BUN SERPL-MCNC: 16 MG/DL (ref 7–22)
CALCIUM SERPL-MCNC: 10.5 MG/DL (ref 8–10.3)
CHLORIDE SERPL-SCNC: 95 MMOL/L (ref 98–108)
CLARITY, POC UA: ABNORMAL
COLOR, POC UA: ABNORMAL
CREAT SERPL-MCNC: 0.8 MG/DL (ref 0.6–1.2)
CTP QC/QA: YES
CTP QC/QA: YES
GLUCOSE SERPL-MCNC: 116 MG/DL (ref 73–118)
GLUCOSE, POC UA: NEGATIVE
HCO3 UR-SCNC: 31.5 MMOL/L (ref 24–28)
INFLUENZA A ANTIGEN, POC: POSITIVE
INFLUENZA B ANTIGEN, POC: NEGATIVE
KETONES, POC UA: NEGATIVE
LDH SERPL L TO P-CCNC: 1.69 MMOL/L (ref 0.5–2.2)
LEUKOCYTE EST, POC UA: ABNORMAL
NITRITE, POC UA: NEGATIVE
PCO2 BLDA: 42.4 MMHG (ref 35–45)
PH SMN: 7.48 [PH] (ref 7.35–7.45)
PH UR STRIP: 7 [PH]
PO2 BLDA: 40 MMHG (ref 40–60)
POC ALT (SGPT): 391 U/L (ref 10–47)
POC AST (SGOT): 513 U/L (ref 11–38)
POC B-TYPE NATRIURETIC PEPTIDE: 11.9 PG/ML (ref 0–100)
POC BE: 7 MMOL/L
POC D-DI: 420 NG/ML (ref 0–450)
POC RAPID STREP A: NEGATIVE
POC SATURATED O2: 79 % (ref 95–100)
POC TCO2: 33 MMOL/L (ref 18–33)
POC TCO2: 33 MMOL/L (ref 24–29)
POTASSIUM BLD-SCNC: 3.3 MMOL/L (ref 3.6–5.1)
PROTEIN, POC UA: ABNORMAL
PROTEIN, POC: 8.1 G/DL (ref 6.4–8.1)
SAMPLE: ABNORMAL
SARS-COV-2 RDRP RESP QL NAA+PROBE: NEGATIVE
SITE: ABNORMAL
SODIUM BLD-SCNC: 140 MMOL/L (ref 128–145)
SPECIFIC GRAVITY, POC UA: 1.02
UROBILINOGEN, POC UA: 0.2 E.U./DL

## 2023-02-04 PROCEDURE — 81025 URINE PREGNANCY TEST: CPT | Mod: ER | Performed by: EMERGENCY MEDICINE

## 2023-02-04 PROCEDURE — 25500020 PHARM REV CODE 255: Mod: ER | Performed by: EMERGENCY MEDICINE

## 2023-02-04 PROCEDURE — 87086 URINE CULTURE/COLONY COUNT: CPT | Performed by: EMERGENCY MEDICINE

## 2023-02-04 PROCEDURE — 63600175 PHARM REV CODE 636 W HCPCS: Mod: ER | Performed by: EMERGENCY MEDICINE

## 2023-02-04 PROCEDURE — 85379 FIBRIN DEGRADATION QUANT: CPT | Mod: ER

## 2023-02-04 PROCEDURE — 83880 ASSAY OF NATRIURETIC PEPTIDE: CPT | Mod: ER

## 2023-02-04 PROCEDURE — 93005 ELECTROCARDIOGRAM TRACING: CPT | Mod: ER

## 2023-02-04 PROCEDURE — 93010 EKG 12-LEAD: ICD-10-PCS | Mod: ,,, | Performed by: INTERNAL MEDICINE

## 2023-02-04 PROCEDURE — 25000003 PHARM REV CODE 250: Mod: ER | Performed by: EMERGENCY MEDICINE

## 2023-02-04 PROCEDURE — 87804 INFLUENZA ASSAY W/OPTIC: CPT | Mod: ER

## 2023-02-04 PROCEDURE — 99291 CRITICAL CARE FIRST HOUR: CPT | Mod: 25,ER

## 2023-02-04 PROCEDURE — 96361 HYDRATE IV INFUSION ADD-ON: CPT | Mod: ER

## 2023-02-04 PROCEDURE — 87040 BLOOD CULTURE FOR BACTERIA: CPT | Performed by: EMERGENCY MEDICINE

## 2023-02-04 PROCEDURE — 96365 THER/PROPH/DIAG IV INF INIT: CPT | Mod: ER

## 2023-02-04 PROCEDURE — 80053 COMPREHEN METABOLIC PANEL: CPT | Mod: ER

## 2023-02-04 PROCEDURE — 82803 BLOOD GASES ANY COMBINATION: CPT | Mod: ER

## 2023-02-04 PROCEDURE — 85025 COMPLETE CBC W/AUTO DIFF WBC: CPT | Mod: ER

## 2023-02-04 PROCEDURE — 93010 ELECTROCARDIOGRAM REPORT: CPT | Mod: ,,, | Performed by: INTERNAL MEDICINE

## 2023-02-04 PROCEDURE — 87635 SARS-COV-2 COVID-19 AMP PRB: CPT | Mod: ER | Performed by: EMERGENCY MEDICINE

## 2023-02-04 RX ORDER — IBUPROFEN 600 MG/1
600 TABLET ORAL EVERY 6 HOURS PRN
Qty: 20 TABLET | Refills: 0 | Status: SHIPPED | OUTPATIENT
Start: 2023-02-04

## 2023-02-04 RX ORDER — NORGESTIMATE AND ETHINYL ESTRADIOL 7DAYSX3 28
KIT ORAL
COMMUNITY

## 2023-02-04 RX ORDER — ACETAMINOPHEN 500 MG
500 TABLET ORAL EVERY 6 HOURS PRN
Qty: 30 TABLET | Refills: 0 | Status: SHIPPED | OUTPATIENT
Start: 2023-02-04

## 2023-02-04 RX ORDER — AMLODIPINE BESYLATE 10 MG/1
10 TABLET ORAL
COMMUNITY
Start: 2022-12-27

## 2023-02-04 RX ORDER — OSELTAMIVIR PHOSPHATE 75 MG/1
75 CAPSULE ORAL 2 TIMES DAILY
Qty: 10 CAPSULE | Refills: 0 | Status: SHIPPED | OUTPATIENT
Start: 2023-02-04 | End: 2023-02-09

## 2023-02-04 RX ORDER — BENZONATATE 200 MG/1
CAPSULE ORAL
COMMUNITY
End: 2023-12-04 | Stop reason: SDUPTHER

## 2023-02-04 RX ORDER — AMOXICILLIN AND CLAVULANATE POTASSIUM 875; 125 MG/1; MG/1
1 TABLET, FILM COATED ORAL 2 TIMES DAILY
Qty: 20 TABLET | Refills: 0 | Status: SHIPPED | OUTPATIENT
Start: 2023-02-04 | End: 2023-02-14

## 2023-02-04 RX ORDER — PREDNISONE 20 MG/1
TABLET ORAL
COMMUNITY

## 2023-02-04 RX ORDER — CETIRIZINE HYDROCHLORIDE, PSEUDOEPHEDRINE HYDROCHLORIDE 5; 120 MG/1; MG/1
TABLET, FILM COATED, EXTENDED RELEASE ORAL
COMMUNITY

## 2023-02-04 RX ORDER — AMOXICILLIN AND CLAVULANATE POTASSIUM 875; 125 MG/1; MG/1
TABLET, FILM COATED ORAL
COMMUNITY
End: 2023-02-04

## 2023-02-04 RX ORDER — FLUTICASONE PROPIONATE 50 MCG
1 SPRAY, SUSPENSION (ML) NASAL 2 TIMES DAILY
Qty: 16 G | Refills: 0 | Status: SHIPPED | OUTPATIENT
Start: 2023-02-04

## 2023-02-04 RX ORDER — ALBUTEROL SULFATE 90 UG/1
AEROSOL, METERED RESPIRATORY (INHALATION)
COMMUNITY
End: 2023-12-04 | Stop reason: SDUPTHER

## 2023-02-04 RX ORDER — PROMETHAZINE HYDROCHLORIDE AND DEXTROMETHORPHAN HYDROBROMIDE 6.25; 15 MG/5ML; MG/5ML
5 SYRUP ORAL 3 TIMES DAILY PRN
Qty: 200 ML | Refills: 0 | Status: SHIPPED | OUTPATIENT
Start: 2023-02-04 | End: 2023-02-14

## 2023-02-04 RX ORDER — ACETAMINOPHEN 500 MG
1000 TABLET ORAL
Status: COMPLETED | OUTPATIENT
Start: 2023-02-04 | End: 2023-02-04

## 2023-02-04 RX ORDER — ASPIRIN 325 MG
TABLET, DELAYED RELEASE (ENTERIC COATED) ORAL
COMMUNITY

## 2023-02-04 RX ORDER — CHLORTHALIDONE 25 MG/1
TABLET ORAL
COMMUNITY

## 2023-02-04 RX ADMIN — POTASSIUM BICARBONATE 25 MEQ: 977.5 TABLET, EFFERVESCENT ORAL at 07:02

## 2023-02-04 RX ADMIN — SODIUM CHLORIDE, POTASSIUM CHLORIDE, SODIUM LACTATE AND CALCIUM CHLORIDE 2748 ML: 600; 310; 30; 20 INJECTION, SOLUTION INTRAVENOUS at 06:02

## 2023-02-04 RX ADMIN — CEFTRIAXONE 1 G: 1 INJECTION, SOLUTION INTRAVENOUS at 07:02

## 2023-02-04 RX ADMIN — IOHEXOL 100 ML: 350 INJECTION, SOLUTION INTRAVENOUS at 07:02

## 2023-02-04 RX ADMIN — ACETAMINOPHEN 1000 MG: 500 TABLET, FILM COATED ORAL at 06:02

## 2023-02-04 NOTE — ED NOTES
Pt passed ambulatory sat test. Pt able to maintain O2 sat greater than 95% while ambulating.    COVID-19 ruled in despite negative lab finding COVID-19 ruled in despite negative lab finding COVID-19 ruled in despite negative lab finding COVID-19 ruled in despite negative lab finding COVID-19 ruled in despite negative lab finding

## 2023-02-04 NOTE — Clinical Note
"Kesha Boucehra"Jossue was seen and treated in our emergency department on 2/4/2023.  She may return to work on 02/08/2023.       If you have any questions or concerns, please don't hesitate to call.      Kylah Zhao, DO"

## 2023-02-06 LAB — BACTERIA UR CULT: NO GROWTH

## 2023-02-08 LAB
BACTERIA BLD CULT: NORMAL
BACTERIA BLD CULT: NORMAL

## 2023-12-04 ENCOUNTER — HOSPITAL ENCOUNTER (EMERGENCY)
Facility: HOSPITAL | Age: 48
Discharge: HOME | End: 2023-12-04
Attending: EMERGENCY MEDICINE
Payer: MEDICAID

## 2023-12-04 VITALS
TEMPERATURE: 98 F | HEART RATE: 75 BPM | DIASTOLIC BLOOD PRESSURE: 99 MMHG | RESPIRATION RATE: 18 BRPM | WEIGHT: 201 LBS | OXYGEN SATURATION: 98 % | HEIGHT: 60 IN | SYSTOLIC BLOOD PRESSURE: 158 MMHG | BODY MASS INDEX: 39.46 KG/M2

## 2023-12-04 DIAGNOSIS — U07.1 COVID: Primary | ICD-10-CM

## 2023-12-04 LAB
B-HCG UR QL: NEGATIVE
CTP QC/QA: YES
CTP QC/QA: YES
INFLUENZA A ANTIGEN, POC: NEGATIVE
INFLUENZA B ANTIGEN, POC: NEGATIVE
SARS-COV-2 RDRP RESP QL NAA+PROBE: POSITIVE

## 2023-12-04 PROCEDURE — 99284 EMERGENCY DEPT VISIT MOD MDM: CPT | Mod: 25,ER

## 2023-12-04 PROCEDURE — 87635 SARS-COV-2 COVID-19 AMP PRB: CPT | Mod: ER | Performed by: EMERGENCY MEDICINE

## 2023-12-04 PROCEDURE — 81025 URINE PREGNANCY TEST: CPT | Mod: ER

## 2023-12-04 PROCEDURE — 87804 INFLUENZA ASSAY W/OPTIC: CPT | Mod: ER

## 2023-12-04 PROCEDURE — 81025 URINE PREGNANCY TEST: CPT | Mod: ER | Performed by: EMERGENCY MEDICINE

## 2023-12-04 RX ORDER — ALBUTEROL SULFATE 90 UG/1
1-2 AEROSOL, METERED RESPIRATORY (INHALATION) EVERY 4 HOURS PRN
Qty: 18 G | Refills: 0 | Status: SHIPPED | OUTPATIENT
Start: 2023-12-04

## 2023-12-04 RX ORDER — CETIRIZINE HYDROCHLORIDE 10 MG/1
10 TABLET ORAL DAILY
Qty: 30 TABLET | Refills: 0 | Status: SHIPPED | OUTPATIENT
Start: 2023-12-04 | End: 2024-12-03

## 2023-12-04 RX ORDER — BENZONATATE 200 MG/1
200 CAPSULE ORAL 3 TIMES DAILY PRN
Qty: 30 CAPSULE | Refills: 0 | Status: SHIPPED | OUTPATIENT
Start: 2023-12-04

## 2023-12-04 NOTE — ED PROVIDER NOTES
Encounter Date: 2023    SCRIBE #1 NOTE: I, Reinier Salazar, am scribing for, and in the presence of,  Rosie Powell PA-C. I have scribed the following portions of the note - Other sections scribed: HPI, ROS, PE.       History     Chief Complaint   Patient presents with    URI     Complains of headache, nasal congestion, cough, and body aches x1 week.     Kesha Marcum is a 48 y.o. female with a PMHx of HTN, presents to the ED for URI symptoms that started 1 week ago. She reports of fever (resolved), generalized myalgias, congestion, ear pain, clear productive cough and chest tightness. Reports being tested negative for COVID-19 4 days ago. Attempted tx with sudafed, Mucinex, Dayquil and ibuprofen. No alleviating or exacerbating factors noted. Denies any associated symptoms.Patient is allergic dilaudid.     The history is provided by the patient. No  was used.     Review of patient's allergies indicates:   Allergen Reactions    Dilaudid [hydromorphone]      Past Medical History:   Diagnosis Date    Hypertension      Past Surgical History:   Procedure Laterality Date     SECTION      OOPHORECTOMY       History reviewed. No pertinent family history.  Social History     Tobacco Use    Smoking status: Never    Smokeless tobacco: Never   Substance Use Topics    Alcohol use: Yes     Comment: socially    Drug use: No     Review of Systems   Constitutional:  Positive for fever (Resolved). Negative for chills and fatigue.   HENT:  Positive for congestion and ear pain. Negative for sinus pressure, sinus pain, sneezing and sore throat.    Eyes:  Negative for visual disturbance.   Respiratory:  Positive for cough and chest tightness. Negative for shortness of breath.    Cardiovascular:  Negative for chest pain.   Gastrointestinal:  Negative for abdominal pain, nausea and vomiting.   Genitourinary:  Negative for difficulty urinating.   Musculoskeletal:  Positive for myalgias. Negative for back  pain.   Skin:  Negative for rash.   Neurological:  Negative for syncope and headaches.       Physical Exam     Initial Vitals [12/04/23 1202]   BP Pulse Resp Temp SpO2   (!) 158/99 75 18 98.2 °F (36.8 °C) 98 %      MAP       --         Physical Exam    Nursing note and vitals reviewed.  Constitutional: She appears well-developed and well-nourished. She is not diaphoretic. No distress.   HENT:   Head: Normocephalic and atraumatic.   Right Ear: External ear normal.   Left Ear: External ear normal.   Cerumen and bilateral ear canals normal with no signs of infection.    Cardiovascular:  Normal rate, regular rhythm, normal heart sounds and intact distal pulses.     Exam reveals no gallop and no friction rub.       No murmur heard.  Pulmonary/Chest: Breath sounds normal. No respiratory distress. She has no wheezes. She has no rhonchi. She has no rales.   Abdominal: Abdomen is soft. Bowel sounds are normal. She exhibits no distension. There is no abdominal tenderness.   Musculoskeletal:         General: No edema.     Neurological: She is alert and oriented to person, place, and time. GCS score is 15. GCS eye subscore is 4. GCS verbal subscore is 5. GCS motor subscore is 6.   Skin: Skin is warm and dry.   Psychiatric: She has a normal mood and affect. Her behavior is normal. Judgment normal.         ED Course   Procedures  Labs Reviewed   SARS-COV-2 RDRP GENE - Abnormal; Notable for the following components:       Result Value    POC Rapid COVID Positive (*)     All other components within normal limits    Narrative:     This test utilizes isothermal nucleic acid amplification technology to detect the SARS-CoV-2 RdRp nucleic acid segment. The analytical sensitivity (limit of detection) is 500 copies/swab.     A POSITIVE result is indicative of the presence of SARS-CoV-2 RNA; clinical correlation with patient history and other diagnostic information is necessary to determine patient infection status.    A NEGATIVE result  means that SARS-CoV-2 nucleic acids are not present above the limit of detection. A NEGATIVE result should be treated as presumptive. It does not rule out the possibility of COVID-19 and should not be the sole basis for treatment decisions. If COVID-19 is strongly suspected based on clinical and exposure history, re-testing using an alternate molecular assay should be considered.     This test is only for use under the Food and Drug Administration s Emergency Use Authorization (EUA).     Commercial kits are provided by [employer]. Performance characteristics of the EUA have been independently verified by [facility] Department of Pathology and Laboratory Medicine.   _________________________________________________________________   The authorized Fact Sheet for Healthcare Providers and the authorized Fact Sheet for Patients of the ID NOW COVID-19 are available on the FDA website:    https://www.fda.gov/media/220969/download      https://www.fda.gov/media/030660/download      POCT URINE PREGNANCY   POCT RAPID INFLUENZA A/B          Imaging Results    None          Medications - No data to display  Medical Decision Making  Kesha Marcum is a 48 y.o. female with a PMHx of HTN, presents to the ED for URI symptoms that started 1 week ago. She reports of fever (resolved), generalized myalgias, congestion, ear pain, clear productive cough and chest tightness. Reports being tested negative for COVID-19 4 days ago. Attempted tx with sudafed, Mucinex, Dayquil and ibuprofen. No alleviating or exacerbating factors noted. Denies any associated symptoms.Patient is allergic dilaudid. Exam above.  Rapid COVID swab resulted positive.  Flu swab negative.  Reviewed findings with patient.  We discussed treatment options at this time including conservative treatment versus treatment with Paxlovid.  Patient elects for conservative treatment at this time.  I will send some medications to her pharmacy for symptomatic  relief.  Return precautions discussed, otherwise follow up primary care doctor.    Of note: Ddx to include but not limited to:  COVID, flu, viral URI      Amount and/or Complexity of Data Reviewed  Labs: ordered. Decision-making details documented in ED Course.    Risk  OTC drugs.  Prescription drug management.            Scribe Attestation:   Scribe #1: I performed the above scribed service and the documentation accurately describes the services I performed. I attest to the accuracy of the note.        ED Course as of 12/04/23 1728   Mon Dec 04, 2023   1419 POCT COVID-19 Rapid Screening(!)  pos [MB]   1419 POCT Rapid Influenza A/B  neg [MB]   1419 POCT urine pregnancy  neg [MB]      ED Course User Index  [MB] Rosie Powell PA-C                     IRosie, personally performed the services described in this documentation. All medical record entries made by the scribe were at my direction and in my presence. I have reviewed the chart and agree that the record reflects my personal performance and is accurate and complete.                  Clinical Impression:  Final diagnoses:  [U07.1] COVID (Primary)          ED Disposition Condition    Discharge Stable          ED Prescriptions       Medication Sig Dispense Start Date End Date Auth. Provider    albuterol (VENTOLIN HFA) 90 mcg/actuation inhaler Inhale 1-2 puffs into the lungs every 4 (four) hours as needed for Wheezing or Shortness of Breath. Rescue 18 g 12/4/2023 -- Rosie Powell PA-C    benzonatate (TESSALON) 200 MG capsule Take 1 capsule (200 mg total) by mouth 3 (three) times daily as needed for Cough. 30 capsule 12/4/2023 -- Rosie Powell PA-C    cetirizine (ZYRTEC) 10 MG tablet Take 1 tablet (10 mg total) by mouth once daily. 30 tablet 12/4/2023 12/3/2024 Rosie Powell PA-C          Follow-up Information       Follow up With Specialties Details Why Contact Info    [facility] Emergency Medicine Go to  As needed, If symptoms  worsen [address] [facility] [address]  [phone]             Rosie Powell PA-C  12/04/23 0308

## 2023-12-04 NOTE — Clinical Note
"Kesha"Earle Marcum was seen and treated in our emergency department on 12/4/2023.     COVID-19 is present in our communities across the state. There is limited testing for COVID at this time, so not all patients can be tested. In this situation, your employee meets the following criteria:    Kesha Marcum has met the criteria for COVID-19 testing and has a POSITIVE result. She can return to work once they are asymptomatic for 24 hours without the use of fever reducing medications AND at least five days from the first positive result. A mask is recommended for 5 days post quarantine.     If you have any questions or concerns, or if I can be of further assistance, please do not hesitate to contact me.    Sincerely,             Rosie Powell PA-C"

## 2023-12-11 ENCOUNTER — PATIENT MESSAGE (OUTPATIENT)
Dept: RESEARCH | Facility: HOSPITAL | Age: 48
End: 2023-12-11
Payer: MEDICAID

## 2024-02-11 ENCOUNTER — HOSPITAL ENCOUNTER (EMERGENCY)
Facility: HOSPITAL | Age: 49
Discharge: HOME | End: 2024-02-11
Attending: EMERGENCY MEDICINE
Payer: MEDICAID

## 2024-02-11 VITALS
DIASTOLIC BLOOD PRESSURE: 93 MMHG | OXYGEN SATURATION: 100 % | HEIGHT: 60 IN | TEMPERATURE: 99 F | SYSTOLIC BLOOD PRESSURE: 146 MMHG | RESPIRATION RATE: 18 BRPM | WEIGHT: 203 LBS | HEART RATE: 96 BPM | BODY MASS INDEX: 39.85 KG/M2

## 2024-02-11 DIAGNOSIS — J10.1 INFLUENZA B: Primary | ICD-10-CM

## 2024-02-11 LAB
B-HCG UR QL: NEGATIVE
CTP QC/QA: YES
CTP QC/QA: YES
INFLUENZA A ANTIGEN, POC: NEGATIVE
INFLUENZA B ANTIGEN, POC: POSITIVE
POC RAPID STREP A: NEGATIVE
SARS-COV-2 RDRP RESP QL NAA+PROBE: NEGATIVE

## 2024-02-11 PROCEDURE — 87635 SARS-COV-2 COVID-19 AMP PRB: CPT | Mod: ER

## 2024-02-11 PROCEDURE — 99284 EMERGENCY DEPT VISIT MOD MDM: CPT | Mod: 25,ER

## 2024-02-11 PROCEDURE — 81025 URINE PREGNANCY TEST: CPT | Mod: ER | Performed by: EMERGENCY MEDICINE

## 2024-02-11 PROCEDURE — 81025 URINE PREGNANCY TEST: CPT | Mod: ER

## 2024-02-11 PROCEDURE — 87804 INFLUENZA ASSAY W/OPTIC: CPT | Mod: 59,ER

## 2024-02-11 PROCEDURE — 87880 STREP A ASSAY W/OPTIC: CPT | Mod: ER

## 2024-02-11 RX ORDER — OSELTAMIVIR PHOSPHATE 75 MG/1
75 CAPSULE ORAL 2 TIMES DAILY
Qty: 10 CAPSULE | Refills: 0 | Status: SHIPPED | OUTPATIENT
Start: 2024-02-11 | End: 2024-02-16

## 2024-02-11 RX ORDER — BENZONATATE 100 MG/1
100 CAPSULE ORAL 3 TIMES DAILY PRN
Qty: 20 CAPSULE | Refills: 0 | Status: SHIPPED | OUTPATIENT
Start: 2024-02-11 | End: 2024-02-21

## 2024-02-11 RX ORDER — ACETAMINOPHEN 500 MG
500 TABLET ORAL EVERY 6 HOURS PRN
Qty: 20 TABLET | Refills: 0 | Status: SHIPPED | OUTPATIENT
Start: 2024-02-11

## 2024-02-11 RX ORDER — GUAIFENESIN 100 MG/5ML
100-200 SOLUTION ORAL EVERY 4 HOURS PRN
Qty: 60 ML | Refills: 0 | Status: SHIPPED | OUTPATIENT
Start: 2024-02-11 | End: 2024-02-21

## 2024-02-11 RX ORDER — IBUPROFEN 600 MG/1
600 TABLET ORAL EVERY 6 HOURS PRN
Qty: 20 TABLET | Refills: 0 | Status: SHIPPED | OUTPATIENT
Start: 2024-02-11

## 2024-02-11 NOTE — DISCHARGE INSTRUCTIONS
You were diagnosed with the flu here in the ER today. Please take all medications as prescribed for symptoms and follow up with your primary care provider. Return to the ER for any new or worsening symptoms. It was a pleasure taking care of you today, I hope you feel better!    Thank you for coming to our Emergency Department today. It is important to remember that some problems are difficult to diagnose and may not be found during your Emergency Department visit. Be sure to follow up with your primary care doctor and review all labs/imaging/tests that were performed during this visit with them. Some labs/tests may be outside of the normal range and require non-emergent follow-up and further investigation to help diagnose/exclude/prevent complications or other medical conditions.    If you do not have a primary care doctor, you may contact the one listed on your discharge paperwork or you may also call the [facility] Appointment Desk at [phone] to schedule an appointment and establish care with one. It is important to your health that you have a primary care doctor.    Please take all medications as directed. All medications may potentially have side-effects and it is impossible to predict which medications may give you side-effects or what side-effects (if any) they will give you.. If you feel that you are having a negative effect or side-effect of any medication you should immediately stop taking them and seek medical attention. If you feel that you are having a life-threatening reaction call 211.    Return to the ER with any questions/concerns, new/concerning symptoms, worsening or failure to improve.     Do not drive, swim, climb to height, take a bath or make any important decisions for 24 hours if you have received any pain medications, sedatives or mood altering drugs during your ER visit.

## 2024-02-11 NOTE — Clinical Note
"Kesha Boucheryumiko Marcum was seen and treated in our emergency department on 2/11/2024.  She may return to work on 02/16/2024.       If you have any questions or concerns, please don't hesitate to call.      Andrade Prasad PA-C"

## 2024-02-11 NOTE — ED PROVIDER NOTES
Encounter Date: 2024    SCRIBE #1 NOTE: I, Roma Forrester, am scribing for, and in the presence of,  Andrade Prasad PA-C. I have scribed the following portions of the note - Other sections scribed: HPI,ROS,PE.       History     Chief Complaint   Patient presents with    Headache     A 49 y/o female presents to the ER c/o flu symptoms accompanied with productive cough N/V/D since Friday. Pt reports taking Mucinex Max strength for Flu and Cold, Theraflu, and Advil without relief.     Nausea    Influenza     Patient is a 48 y.o. female with a past medical history of hypertension who presents to the Emergency Department for evaluation of URI symptoms x 3 days.  Symptoms include fever, headache, chills, diarrhea, body aches, cough, congestion, rhinorrhea, and sore throat.  She has not taken any medications for the symptoms. She is vaccinated for COVID but not the flu. Reports known sick contacts with strep.  She denies chest pain, shortness of breath, abdominal pain or bloody stool.  Denies difficulty swallowing, otalgia.      The history is provided by the patient. No  was used.     Review of patient's allergies indicates:   Allergen Reactions    Dilaudid [hydromorphone]      Past Medical History:   Diagnosis Date    Hypertension      Past Surgical History:   Procedure Laterality Date     SECTION      OOPHORECTOMY       No family history on file.  Social History     Tobacco Use    Smoking status: Never    Smokeless tobacco: Never   Substance Use Topics    Alcohol use: Yes     Comment: socially    Drug use: No     Review of Systems   Constitutional:  Positive for chills and fever.   HENT:  Positive for congestion, rhinorrhea and sore throat. Negative for ear pain and trouble swallowing.    Respiratory:  Positive for cough. Negative for shortness of breath.    Cardiovascular:  Negative for chest pain.   Gastrointestinal:  Positive for diarrhea. Negative for abdominal pain and blood in  stool.   Musculoskeletal:  Positive for myalgias. Negative for neck pain and neck stiffness.   Neurological:  Positive for headaches.       Physical Exam     Initial Vitals [02/11/24 0842]   BP Pulse Resp Temp SpO2   (!) 146/93 96 18 98.5 °F (36.9 °C) 100 %      MAP       --         Physical Exam    Nursing note and vitals reviewed.  Constitutional: She appears well-developed and well-nourished.   HENT:   Head: Normocephalic and atraumatic.   Right Ear: External ear normal.   Left Ear: External ear normal.   No posterior oropharyngeal erythema, no tonsillar swelling, no oropharyngeal exudates, uvula is midline. No trismus. No muffled voice. Patient is tolerating secretions without difficulty. Patient is speaking in full sentences on exam without difficulty.  Bilateral tympanic membranes are pearly gray without erythema, bulging, perforation. There is no postauricular swelling, or overlying erythema or tenderness to palpation over mastoids bilaterally. Build up of ear wax in bilateral canals.   Neck: Carotid bruit is not present.   Normal range of motion.  Cardiovascular:  Normal rate, regular rhythm, normal heart sounds and intact distal pulses.     Exam reveals no gallop and no friction rub.       No murmur heard.  Pulmonary/Chest: Breath sounds normal. No respiratory distress. She has no wheezes. She has no rhonchi. She has no rales.   Abdominal: Abdomen is soft. Bowel sounds are normal. She exhibits no distension. There is no abdominal tenderness. There is no rebound and no guarding.   Musculoskeletal:         General: Normal range of motion.      Cervical back: Normal range of motion.     Neurological: She is alert and oriented to person, place, and time. GCS score is 15. GCS eye subscore is 4. GCS verbal subscore is 5. GCS motor subscore is 6.   Psychiatric: She has a normal mood and affect.         ED Course   Procedures  Labs Reviewed   POCT RAPID INFLUENZA A/B - Abnormal; Notable for the following  components:       Result Value    Influenza B Ag positive (*)     All other components within normal limits   SARS-COV-2 RDRP GENE    Narrative:     This test utilizes isothermal nucleic acid amplification technology to detect the SARS-CoV-2 RdRp nucleic acid segment. The analytical sensitivity (limit of detection) is 500 copies/swab.     A POSITIVE result is indicative of the presence of SARS-CoV-2 RNA; clinical correlation with patient history and other diagnostic information is necessary to determine patient infection status.    A NEGATIVE result means that SARS-CoV-2 nucleic acids are not present above the limit of detection. A NEGATIVE result should be treated as presumptive. It does not rule out the possibility of COVID-19 and should not be the sole basis for treatment decisions. If COVID-19 is strongly suspected based on clinical and exposure history, re-testing using an alternate molecular assay should be considered.     Commercial kits are provided by [employer].   _________________________________________________________________   The authorized Fact Sheet for Healthcare Providers and the authorized Fact Sheet for Patients of the ID NOW COVID-19 are available on the FDA website:    https://www.fda.gov/media/506146/download      https://www.fda.gov/media/073560/download      POCT URINE PREGNANCY   POCT STREP A MOLECULAR   POCT INFLUENZA A/B MOLECULAR   POCT STREP A, RAPID          Imaging Results    None          Medications - No data to display  Medical Decision Making  This is an emergent evaluation of a 48 y.o. female with a past medical history of hypertension who presents to the Emergency Department for evaluation of URI symptoms x 3 days.  Symptoms include fever, headache, chills, diarrhea, body aches, cough, congestion, rhinorrhea, and sore throat.      On physical exam, there is no posterior oropharyngeal erythema, no tonsillar swelling, no oropharyngeal exudates, uvula is midline. No  trismus. No muffled voice. Patient is tolerating secretions without difficulty. Patient is speaking in full sentences on exam without difficulty.  Bilateral tympanic membranes are pearly gray without erythema, bulging, perforation. There is no postauricular swelling, or overlying erythema or tenderness to palpation over mastoids bilaterally. Build up of ear wax in bilateral canals. Regular rate rhythm without murmurs.  No carotid bruits appreciated on exam. Lungs are clear to auscultation bilaterally.  Abdomen is soft, nontender, non distended, with normal bowel sounds.     Differential diagnosis includes but is not limited to COVID, flu, strep, viral URI.  Considered pneumonia but highly doubtful given normal lung exam findings.    Workup initiated with viral swabs.  Influenza B positive.  COVID and strep negative.  Patient does meet criteria for Tamiflu therapy.  Will discharge home with Tamiflu and other symptomatic medications as listed below.  Work note provided. Patient is very well appearing, and in no acute distress. Vital signs are reassuring here in the emergency department, patient is afebrile, breathing comfortable, satting 100 % on room air. Patient/Caregiver is stable for discharge at this time. Patient/Caregiver verbalize understanding of care plan. All questions and concerns were addressed. Discussed strict return precautions with the patient/caregiver. Instructed follow up with primary care provider within 1 week.      Andrade Prasad PA-C    DISCLAIMER: This note was prepared with [employer] voice recognition transcription software. Garbled syntax, mangled pronouns, and other bizarre constructions may be attributed to that software system.     Amount and/or Complexity of Data Reviewed  Labs: ordered.    Risk  OTC drugs.  Prescription drug management.            Scribe Attestation:   Scribe #1: I performed the above scribed service and the documentation accurately describes the services I performed. I  attest to the accuracy of the note.              I, Andrade Prasad PA-C, personally performed the services described in this documentation.  All medical record entries made by the scribe were at my direction and in my presence.  I have reviewed the chart and agree that the record reflects my personal performance and is accurate and complete.                   Clinical Impression:  Final diagnoses:  [J10.1] Influenza B (Primary)          ED Disposition Condition    Discharge Stable          ED Prescriptions       Medication Sig Dispense Start Date End Date Auth. Provider    acetaminophen (TYLENOL) 500 MG tablet Take 1 tablet (500 mg total) by mouth every 6 (six) hours as needed. 20 tablet 2/11/2024 -- Andrade Prasad PA-C    ibuprofen (ADVIL,MOTRIN) 600 MG tablet Take 1 tablet (600 mg total) by mouth every 6 (six) hours as needed for Pain. 20 tablet 2/11/2024 -- Andrade Prasad PA-C    benzonatate (TESSALON) 100 MG capsule Take 1 capsule (100 mg total) by mouth 3 (three) times daily as needed. 20 capsule 2/11/2024 2/21/2024 Andrade Prasad PA-C    guaiFENesin 100 mg/5 ml (ROBITUSSIN) 100 mg/5 mL syrup Take 5-10 mLs (100-200 mg total) by mouth every 4 (four) hours as needed for Cough. 60 mL 2/11/2024 2/21/2024 Andrade Prasad PA-C    oseltamivir (TAMIFLU) 75 MG capsule Take 1 capsule (75 mg total) by mouth 2 (two) times daily. for 5 days 10 capsule 2/11/2024 2/16/2024 Andrade Prasad PA-C    benzocaine-menthoL 15-3.6 mg Lozg Follow directions on packaging 18 lozenge 2/11/2024 -- Andrade Prasad PA-C          Follow-up Information       Follow up With Specialties Details Why Contact [facility] ED Emergency Medicine Go to  As needed, If symptoms worsen, or new symptoms develop [id number] [facility] [id number] [phone]             Andrade Prasad PA-C  02/11/24 6276

## 2025-07-23 NOTE — ED PROVIDER NOTES
Encounter Date: 2023       History     Chief Complaint   Patient presents with    FLU SYMPTOMS     PT REPORTS COUGH AND CONGESTIONSINCE BEFORE . PT REPORTS SEEN HERE AT THAT TIME, SEEN AT PCP THIS PAST Monday AND REPORTS GETTING WORSE. PRODUCTIVE COUGH, BODYACHES, CHILLS, HA. TOOK IBUPROFEN 600MG AT 0500     47 y.o. female with hypertension presents emergency department complaining of subacute nasal congestion, rhinorrhea, productive cough with green sputum, body aches, headaches, chills, sore throat, intermittent fever and fatigue that began over three weeks ago.  Reports maximum temperature 102.3°.  Seen in the emergency department for this complaint a week and half ago prescribed Zyrtec D without improvement.  Seen in PCP's office  Five days ago prescribed five day course of steroids and given an albuterol inhaler.  Patient reports worsening symptoms of his congestion, rhinorrhea, cough, body aches and headaches last night.  She mentions chest pain with coughing only.  She reports taking  600 mg ibuprofen 5:00 a.m..  She reports intermittent nausea but denies vomiting, diarrhea, shortness of breath focal weakness or syncope.  Denies tobacco use.    The history is provided by the patient.   Review of patient's allergies indicates:   Allergen Reactions    Dilaudid [hydromorphone]      Past Medical History:   Diagnosis Date    Hypertension      Past Surgical History:   Procedure Laterality Date     SECTION      OOPHORECTOMY       No family history on file.  Social History     Tobacco Use    Smoking status: Never    Smokeless tobacco: Never   Substance Use Topics    Alcohol use: Yes     Comment: socially    Drug use: No     Review of Systems   Constitutional:  Positive for chills, fatigue and fever. Negative for appetite change and diaphoresis.   HENT:  Positive for congestion, rhinorrhea and sore throat. Negative for postnasal drip, trouble swallowing and voice change.    Respiratory:  Positive  I called patient to assist with rescheduling Sx for Dr. Muir from 8/8 to 8/25. Patient said she will call back after her sons wedding    for cough. Negative for shortness of breath.    Cardiovascular:  Positive for chest pain (with cough only).   Gastrointestinal:  Positive for nausea. Negative for diarrhea and vomiting.   Musculoskeletal:  Positive for myalgias. Negative for gait problem.   Neurological:  Positive for headaches.   All other systems reviewed and are negative.    Physical Exam     Initial Vitals [02/04/23 0556]   BP Pulse Resp Temp SpO2   115/80 (!) 135 20 (!) 100.4 °F (38 °C) (!) 94 %      MAP       --         Physical Exam    Nursing note and vitals reviewed.  Constitutional: She appears well-developed and well-nourished. She is not diaphoretic. No distress.   HENT:   Head: Normocephalic and atraumatic.   Mouth/Throat: Oropharynx is clear and moist.   Eyes: Conjunctivae are normal.   Neck: Phonation normal. Neck supple. No stridor present.   Normal range of motion.  Cardiovascular:  Normal rate and intact distal pulses.           Pulmonary/Chest: Breath sounds normal. No accessory muscle usage or stridor. No tachypnea. No respiratory distress. She has no wheezes. She has no rhonchi. She has no rales.   Abdominal: She exhibits no distension. There is no abdominal tenderness.   Musculoskeletal:         General: No tenderness. Normal range of motion.      Cervical back: Normal range of motion and neck supple.     Neurological: She is alert and oriented to person, place, and time. She has normal strength. Gait normal. GCS eye subscore is 4. GCS verbal subscore is 5. GCS motor subscore is 6.   Skin: Skin is warm. Capillary refill takes less than 2 seconds.   Psychiatric: She has a normal mood and affect.       ED Course   Critical Care    Date/Time: 2/4/2023 7:36 AM  Performed by: Kylah Zhao DO  Authorized by: Sonia Delatorre MD   Direct patient critical care time: 8 minutes  Additional history critical care time: 8 minutes  Ordering / reviewing critical care time: 8 minutes  Documentation critical care time: 8 minutes  Consult with  family critical care time: 8 minutes  Total critical care time (exclusive of procedural time) : 40 minutes  Critical care was necessary to treat or prevent imminent or life-threatening deterioration of the following conditions: respiratory failure.  Critical care was time spent personally by me on the following activities: evaluation of patient's response to treatment, examination of patient, obtaining history from patient or surrogate, ordering and performing treatments and interventions, ordering and review of laboratory studies, ordering and review of radiographic studies, re-evaluation of patient's condition, pulse oximetry and review of old charts.  Subsequent provider of critical care: I assumed direction of critical care for this patient from another provider of my specialty.      Labs Reviewed   POCT URINALYSIS W/O SCOPE - Abnormal; Notable for the following components:       Result Value    Blood, UA 3+ (*)     Protein, UA 1+ (*)     Leukocytes, UA Trace (*)     All other components within normal limits   POCT RAPID INFLUENZA A/B - Abnormal; Notable for the following components:    Inflenza A Ag positive (*)     All other components within normal limits   ISTAT PROCEDURE - Abnormal; Notable for the following components:    POC PH 7.479 (*)     POC HCO3 31.5 (*)     POC SATURATED O2 79 (*)     POC TCO2 33 (*)     All other components within normal limits   POCT CMP - Abnormal; Notable for the following components:    ALT (SGPT),  (*)     AST (SGOT),  (*)     Calcium, POC 10.5 (*)     POC Chloride 95 (*)     POC Potassium 3.3 (*)     All other components within normal limits   CULTURE, BLOOD   CULTURE, BLOOD   CULTURE, URINE   POCT URINE PREGNANCY   POCT CBC   POCT URINALYSIS W/O SCOPE   SARS-COV-2 RDRP GENE    Narrative:     This test utilizes isothermal nucleic acid amplification technology to detect the SARS-CoV-2 RdRp nucleic acid segment. The analytical sensitivity (limit of detection) is  500 copies/swab.     A POSITIVE result is indicative of the presence of SARS-CoV-2 RNA; clinical correlation with patient history and other diagnostic information is necessary to determine patient infection status.    A NEGATIVE result means that SARS-CoV-2 nucleic acids are not present above the limit of detection. A NEGATIVE result should be treated as presumptive. It does not rule out the possibility of COVID-19 and should not be the sole basis for treatment decisions. If COVID-19 is strongly suspected based on clinical and exposure history, re-testing using an alternate molecular assay should be considered.     This test is only for use under the Food and Drug Administration s Emergency Use Authorization (EUA).     Commercial kits are provided by [employer]. Performance characteristics of the EUA have been independently verified by [facility] Department of Pathology and Laboratory Medicine.   _________________________________________________________________   The authorized Fact Sheet for Healthcare Providers and the authorized Fact Sheet for Patients of the ID NOW COVID-19 are available on the FDA website:    https://www.fda.gov/media/502268/download      https://www.fda.gov/media/549026/download      POCT CMP   POCT D DIMER   POCT B-TYPE NATRIURETIC PEPTIDE (BNP)   POCT STREP A, RAPID   POCT D DIMER   POCT B-TYPE NATRIURETIC PEPTIDE (BNP)     EKG Readings: (Independently Interpreted)   Initial Reading: No STEMI. Rhythm: Sinus Tachycardia. Heart Rate: 127. Ectopy: No Ectopy. Conduction: Normal. ST Segments: Normal ST Segments. T Waves: Normal. Axis: Left Axis Deviation. Clinical Impression: Normal Sinus Rhythm     Imaging Results              CTA Chest Non-Coronary (PE Studies) (Final result)  Result time 02/04/23 07:53:09      Final result by Ferdinand Toney MD (02/04/23 07:53:09)                   Impression:      No evidence of pulmonary embolism.    Ground-glass appearance within the  lungs, nonspecific for which correlation advised.  Pulmonary edema and viral pneumonias are leading considerations.    Rounded consolidation RIGHT lung base, likely focal atelectasis of the although pulmonary nodule/developing pneumonia not excluded.  Follow-up recommended.      Electronically signed by: Ferdinand Toney MD  Date:    02/04/2023  Time:    07:53               Narrative:    EXAMINATION:  CTA CHEST NON CORONARY (PE STUDIES)    CLINICAL HISTORY:  Pulmonary embolism (PE) suspected, high prob;    TECHNIQUE:  Low dose axial images, sagittal and coronal reformations were obtained from the thoracic inlet to the lung bases following the IV administration of 100 mL of Omnipaque 350.  Contrast timing was optimized to evaluate the pulmonary arteries.    Technical quality of the exam: Satisfactory.    COMPARISON:  None    FINDINGS:  - Pulmonary arteries: There are no filling defects in the pulmonary arteries to indicate a pulmonary embolism.    - Lungs: Ground-glass appearance within the lungs, with considerations including pulmonary edema and viral pneumonia.  Appearance is nonspecific and additional etiologies such as hypersensitivity pneumonitis, early fibrotic change, eosinophilic pneumonia and hypersensitivity reaction can have a similar appearance.  More focal consolidation at the level the RIGHT lung base likely atelectasis although pulmonary nodule not excluded.  Follow-up could further evaluate.    - Pleura: No thickening or fluid.    - Thyroid: Visualized thyroid is normal.    - Axilla: No adenopathy.    - Mediastinum: No adenopathy.    - Fiordaliza: No adenopathy.    - Heart/Aorta: Negative.    - Osseous: No acute abnormality.    - Visualized upper abdomen: Negative.                                       X-Ray Chest AP Portable (Final result)  Result time 02/04/23 06:59:53      Final result by Ferdinand Toney MD (02/04/23 06:59:53)                   Impression:      No acute abnormality.      Electronically  signed by: Ferdinand Toney MD  Date:    02/04/2023  Time:    06:59               Narrative:    EXAMINATION:  XR CHEST AP PORTABLE    CLINICAL HISTORY:  Sepsis;    TECHNIQUE:  Single frontal view of the chest was performed.    COMPARISON:  02/27/2022    FINDINGS:  The lungs are clear with normal appearance of pulmonary vasculature. No pleural effusion. No evident pneumothorax.    The cardiac silhouette is normal in size. The hilar and mediastinal contours are unremarkable.    Bones are intact.                                       Medications   acetaminophen tablet 1,000 mg (1,000 mg Oral Given 2/4/23 0630)   lactated ringers bolus 2,748 mL (0 mLs Intravenous Stopped 2/4/23 0738)   cefTRIAXone (ROCEPHIN) 1 g/50 mL D5W IVPB (0 g Intravenous Stopped 2/4/23 0826)   iohexoL (OMNIPAQUE 350) injection 100 mL (100 mLs Intravenous Given 2/4/23 0747)   potassium bicarbonate disintegrating tablet 25 mEq (25 mEq Oral Given 2/4/23 0755)                         I assumed care of this patient at 7:00 a.m. from Dr. Delatorre.  Patient is resting comfortably at this time.  O2 sat 97%.  Obtained ambulatory pulse ox patient maintained O2 sats greater than 96%.  Patient does reports she is feeling much better  Medical decision making   Chief complaint:   Chief Complaint   Patient presents with    FLU SYMPTOMS     PT REPORTS COUGH AND CONGESTIONSINCE BEFORE JAN 24TH. PT REPORTS SEEN HERE AT THAT TIME, SEEN AT PCP THIS PAST Monday AND REPORTS GETTING WORSE. PRODUCTIVE COUGH, BODYACHES, CHILLS, HA. TOOK IBUPROFEN 600MG AT 0500      Differential diagnosis:  Pregnancy, respiratory insufficiency, influenza a, influenza B, COVID, sepsis, pneumonia, and electrolyte balance.  Treatment in the ED included Physical Exam and   Medications   acetaminophen tablet 1,000 mg (1,000 mg Oral Given 2/4/23 0630)   lactated ringers bolus 2,748 mL (0 mLs Intravenous Stopped 2/4/23 0738)   cefTRIAXone (ROCEPHIN) 1 g/50 mL D5W IVPB (0 g Intravenous Stopped 2/4/23  0826)   iohexoL (OMNIPAQUE 350) injection 100 mL (100 mLs Intravenous Given 2/4/23 1605)   potassium bicarbonate disintegrating tablet 25 mEq (25 mEq Oral Given 2/4/23 3941)     Shared decision making with patient we did discuss possible admit.  Patient is who transferred admission at [facility] if necessary patient prefers not to be admitted to the hospital.  Patient reports she is feeling much better after getting treated in the emergency room.  Patient is tolerating p.o. without difficulty.  Patient's O2 sats are remaining above 94%.  Patient's heart rate is significantly improved.  When I walked into the room patient had heart rate of 92.  Patient with a normal white blood cell count of 9.5.  Hemoglobin hematocrit within normal limits at 14 and 43.  Platelets of 398.  Lactic acid within normal limits at 1.69.  PH 7.4.  Renal function normal with BUN of 16 and creatinine of 0.8.  Influenza a positive.  Flu B negative.  COVID negative.  Strep test negative.  UA positive for leukocytes and blood.  Urine culture pending.  Patient treated with ceftriaxone in the ED.  Potassium low at 3.3.  Oral replacement provided.  Patient reports feeling better after treatment   Discussed with patient treatment plan, outpatient follow-up, labs, and imaging results.    Fill and take prescriptions as directed.  ED Prescriptions       Medication Sig Dispense Start Date End Date Auth. Provider    ibuprofen (ADVIL,MOTRIN) 600 MG tablet Take 1 tablet (600 mg total) by mouth every 6 (six) hours as needed for Pain (Take with food as needed for mild-to-moderate pain). 20 tablet 2/4/2023 -- Kylah Zhao DO    acetaminophen (TYLENOL) 500 MG tablet Take 1 tablet (500 mg total) by mouth every 6 (six) hours as needed for Pain. 30 tablet 2/4/2023 -- Kylah Zhao DO    amoxicillin-clavulanate 875-125mg (AUGMENTIN) 875-125 mg per tablet Take 1 tablet by mouth 2 (two) times daily. for 10 days 20 tablet 2/4/2023 2/14/2023 Kylah  Pavel, DO    oseltamivir (TAMIFLU) 75 MG capsule Take 1 capsule (75 mg total) by mouth 2 (two) times daily. for 5 days 10 capsule 2/4/2023 2/9/2023 Kylah Zhao DO    promethazine-dextromethorphan (PROMETHAZINE-DM) 6.25-15 mg/5 mL Syrp Take 5 mLs by mouth 3 (three) times daily as needed (cougn and congestion). 200 mL 2/4/2023 2/14/2023 Kylah Zhao DO    fluticasone propionate (FLONASE) 50 mcg/actuation nasal spray 1 spray (50 mcg total) by Each Nostril route 2 (two) times daily. 16 g 2/4/2023 -- Kylah Zhao, DO    sodium chloride (OCEAN NASAL) 0.65 % nasal spray 1 spray by Nasal route every 3 (three) hours as needed for Congestion. 30 mL 2/4/2023 -- Kylah Zhao, DO            Return to the ED if symptoms worsen or do not resolve.   Answered questions and discussed discharge plan.    Shared decision-making this patient.  Patient is ambulating without difficulty and maintaining normal O2 sats.  Patient feels better and is ready for discharge.  Follow up with PCP/specialist in 1 day.         Clinical Impression:   Final diagnoses:  [R00.0] Tachycardia  [J10.1] Influenza A (Primary)  [N39.0] Acute urinary tract infection  [E87.6] Hypokalemia        ED Disposition Condition    Discharge Stable          ED Prescriptions       Medication Sig Dispense Start Date End Date Auth. Provider    ibuprofen (ADVIL,MOTRIN) 600 MG tablet Take 1 tablet (600 mg total) by mouth every 6 (six) hours as needed for Pain (Take with food as needed for mild-to-moderate pain). 20 tablet 2/4/2023 -- Kylah Zhao, DO    acetaminophen (TYLENOL) 500 MG tablet Take 1 tablet (500 mg total) by mouth every 6 (six) hours as needed for Pain. 30 tablet 2/4/2023 -- Kylah Zhao DO    amoxicillin-clavulanate 875-125mg (AUGMENTIN) 875-125 mg per tablet Take 1 tablet by mouth 2 (two) times daily. for 10 days 20 tablet 2/4/2023 2/14/2023 Kylah Zhao DO    oseltamivir (TAMIFLU) 75 MG capsule Take 1 capsule (75 mg total) by mouth 2 (two) times daily. for 5 days  10 capsule 2/4/2023 2/9/2023 Kylah Zhao, DO    promethazine-dextromethorphan (PROMETHAZINE-DM) 6.25-15 mg/5 mL Syrp Take 5 mLs by mouth 3 (three) times daily as needed (cougn and congestion). 200 mL 2/4/2023 2/14/2023 Kylah Zhao, DO    fluticasone propionate (FLONASE) 50 mcg/actuation nasal spray 1 spray (50 mcg total) by Each Nostril route 2 (two) times daily. 16 g 2/4/2023 -- Kylah Zhao, DO    sodium chloride (OCEAN NASAL) 0.65 % nasal spray 1 spray by Nasal route every 3 (three) hours as needed for Congestion. 30 mL 2/4/2023 -- Kylah Zhao, DO          Follow-up Information       Follow up With Specialties Details Why Contact Info    Your PCP  Call  As needed, for ongoing care     The nearest emergency department.  Go to  As needed, If symptoms worsen     [facility]  [place]ne  Schedule an appointment as soon as possible for a visit in 1 day  [address]  [place] [address]  [phone]      [facility] - Emergency Dept Emergency Medicine Go to  Please go to [facility] emergency department if symptoms worsen [address] [facility] [id number] [phone]             Kylah Zhao DO  02/04/23 9311